# Patient Record
Sex: FEMALE | Race: WHITE | NOT HISPANIC OR LATINO | Employment: FULL TIME
[De-identification: names, ages, dates, MRNs, and addresses within clinical notes are randomized per-mention and may not be internally consistent; named-entity substitution may affect disease eponyms.]

---

## 2018-02-04 ENCOUNTER — HEALTH MAINTENANCE LETTER (OUTPATIENT)
Age: 33
End: 2018-02-04

## 2018-09-19 ENCOUNTER — APPOINTMENT (OUTPATIENT)
Dept: ULTRASOUND IMAGING | Facility: CLINIC | Age: 33
End: 2018-09-19
Attending: FAMILY MEDICINE
Payer: COMMERCIAL

## 2018-09-19 ENCOUNTER — HOSPITAL ENCOUNTER (EMERGENCY)
Facility: CLINIC | Age: 33
Discharge: HOME | End: 2018-09-19
Attending: FAMILY MEDICINE | Admitting: FAMILY MEDICINE
Payer: COMMERCIAL

## 2018-09-19 VITALS
RESPIRATION RATE: 16 BRPM | HEART RATE: 84 BPM | TEMPERATURE: 98.3 F | OXYGEN SATURATION: 98 % | WEIGHT: 238.25 LBS | BODY MASS INDEX: 39.65 KG/M2 | DIASTOLIC BLOOD PRESSURE: 87 MMHG | SYSTOLIC BLOOD PRESSURE: 141 MMHG

## 2018-09-19 DIAGNOSIS — O20.9 BLEEDING IN EARLY PREGNANCY: ICD-10-CM

## 2018-09-19 LAB
ABO + RH BLD: NORMAL
ABO + RH BLD: NORMAL
B-HCG SERPL-ACNC: 299 IU/L (ref 0–5)
BASOPHILS # BLD AUTO: 0 10E9/L (ref 0–0.2)
BASOPHILS NFR BLD AUTO: 0.2 %
BLOOD BANK CMNT PATIENT-IMP: NORMAL
BLOOD BANK CMNT PATIENT-IMP: NORMAL
DIFFERENTIAL METHOD BLD: ABNORMAL
EOSINOPHIL # BLD AUTO: 0.1 10E9/L (ref 0–0.7)
EOSINOPHIL NFR BLD AUTO: 0.4 %
ERYTHROCYTE [DISTWIDTH] IN BLOOD BY AUTOMATED COUNT: 12.6 % (ref 10–15)
FETAL CELL SCN BLD QL ROSETTE: NORMAL
HCG UR QL: POSITIVE
HCT VFR BLD AUTO: 42.4 % (ref 35–47)
HGB BLD-MCNC: 14.5 G/DL (ref 11.7–15.7)
IMM GRANULOCYTES # BLD: 0 10E9/L (ref 0–0.4)
IMM GRANULOCYTES NFR BLD: 0.3 %
INTERNAL QC OK POCT: YES
LYMPHOCYTES # BLD AUTO: 1.3 10E9/L (ref 0.8–5.3)
LYMPHOCYTES NFR BLD AUTO: 8.4 %
MCH RBC QN AUTO: 31.3 PG (ref 26.5–33)
MCHC RBC AUTO-ENTMCNC: 34.2 G/DL (ref 31.5–36.5)
MCV RBC AUTO: 91 FL (ref 78–100)
MONOCYTES # BLD AUTO: 1 10E9/L (ref 0–1.3)
MONOCYTES NFR BLD AUTO: 6.4 %
NEUTROPHILS # BLD AUTO: 12.6 10E9/L (ref 1.6–8.3)
NEUTROPHILS NFR BLD AUTO: 84.3 %
NRBC # BLD AUTO: 0 10*3/UL
NRBC BLD AUTO-RTO: 0 /100
PLATELET # BLD AUTO: 267 10E9/L (ref 150–450)
RBC # BLD AUTO: 4.64 10E12/L (ref 3.8–5.2)
RH IG VIALS RECOM PATIENT: NORMAL
WBC # BLD AUTO: 15 10E9/L (ref 4–11)

## 2018-09-19 PROCEDURE — 86901 BLOOD TYPING SEROLOGIC RH(D): CPT | Performed by: FAMILY MEDICINE

## 2018-09-19 PROCEDURE — 86900 BLOOD TYPING SEROLOGIC ABO: CPT | Performed by: FAMILY MEDICINE

## 2018-09-19 PROCEDURE — 99284 EMERGENCY DEPT VISIT MOD MDM: CPT | Mod: Z6 | Performed by: FAMILY MEDICINE

## 2018-09-19 PROCEDURE — 99284 EMERGENCY DEPT VISIT MOD MDM: CPT | Mod: 25 | Performed by: FAMILY MEDICINE

## 2018-09-19 PROCEDURE — 85461 HEMOGLOBIN FETAL: CPT | Performed by: FAMILY MEDICINE

## 2018-09-19 PROCEDURE — 84702 CHORIONIC GONADOTROPIN TEST: CPT | Performed by: FAMILY MEDICINE

## 2018-09-19 PROCEDURE — 81025 URINE PREGNANCY TEST: CPT | Performed by: FAMILY MEDICINE

## 2018-09-19 PROCEDURE — 85025 COMPLETE CBC W/AUTO DIFF WBC: CPT | Performed by: FAMILY MEDICINE

## 2018-09-19 PROCEDURE — 76817 TRANSVAGINAL US OBSTETRIC: CPT

## 2018-09-19 RX ORDER — PRENATAL VIT/IRON FUM/FOLIC AC 27MG-0.8MG
1 TABLET ORAL DAILY
COMMUNITY

## 2018-09-19 NOTE — ED PROVIDER NOTES
History     Chief Complaint   Patient presents with     Vaginal Bleeding     pt found out that she was pregnant yesterday, started having vaginal bleeding yesterday, ? last period 1 week ago 1 month prior also, going through fertility testing     HPI  Neetu Bella is a 33 year old female who presents with right-sided pelvic pain and vaginal bleeding.  Patient is  1 para 0, being followed by the [facility].  They are assisting her in getting pregnant with her wife.  She was treated with Clomid, an ovulatory agent, and intrauterine insemination in late August.  Early yesterday morning she developed scant vaginal bleeding and some right-sided pelvic pain.  She was seen at the [facility] and reportedly had an hCG of 420.  She advised to return for serial hCG tomorrow.  This morning her bleeding became more prominent and the pain increased, she called the clinic and was referred to the emergency department.  Denies any other symptoms, no urinary complaints, no fever, no history of STDs or abdominal surgery.    I have reviewed the Medications, Allergies, Past Medical and Surgical History, and Social History in the Epic system.    Review of Systems  All other systems were reviewed and are negative    Physical Exam   BP: (P) 141/84  Pulse: (P) 87  Temp: (P) 98.3  F (36.8  C)  Resp: (P) 16  Weight: (P) 108.1 kg (238 lb 4 oz)  SpO2: (P) 99 %      Physical Exam   Constitutional: She is oriented to person, place, and time. She appears well-developed and well-nourished.   HENT:   Head: Normocephalic and atraumatic.   Mouth/Throat: Oropharynx is clear and moist.   Eyes: EOM are normal. Pupils are equal, round, and reactive to light.   Neck: Normal range of motion. Neck supple. No tracheal deviation present. No thyromegaly present.   Cardiovascular: Normal rate, regular rhythm, normal heart sounds and intact distal pulses.  Exam reveals no gallop and no friction rub.     No murmur heard.  Pulmonary/Chest: Effort normal and breath sounds normal. She exhibits no tenderness.   Abdominal: Soft. Bowel sounds are normal. She exhibits no distension and no mass. There is no tenderness.   Genitourinary: Cervix exhibits discharge (The cervix is closed to visualization and direct palpation but there is blood oozing from the cervical  os). Cervix exhibits no motion tenderness and no friability. Right adnexum displays tenderness. Right adnexum displays no fullness. There is bleeding in the vagina.   Musculoskeletal: She exhibits no edema or tenderness.   Neurological: She is alert and oriented to person, place, and time. No cranial nerve deficit. Coordination normal.   Skin: Skin is warm and dry. No rash noted.   Psychiatric: She has a normal mood and affect. Her behavior is normal.   Nursing note and vitals reviewed.      ED Course     ED Course     Procedures             Critical Care time:  none             Labs Ordered and Resulted from Time of ED Arrival Up to the Time of Departure from the ED   CBC WITH PLATELETS DIFFERENTIAL - Abnormal; Notable for the following:        Result Value    WBC 15.0 (*)     Absolute Neutrophil 12.6 (*)     All other components within normal limits   HCG QUANTITATIVE PREGNANCY - Abnormal; Notable for the following:     HCG Quantitative Serum 299 (*)     All other components within normal limits   HCG QUAL URINE POCT - Abnormal; Notable for the following:    PREP FOR PROCEDURE   RHOGAM ORDER   RH IMMUNE GLOBULIN STUDY            Assessments & Plan (with Medical Decision Making)   Patient who is  1 para 0, infertility patient with early first trimester pregnancy, presenting with right adnexal pain and vaginal bleeding.  Differential diagnosis includes ectopic pregnancy, spontaneous AB, threatened AB, subchorionic hemorrhage, ovarian cyst or torsion, cervicitis.  On exam her vital signs are normal.  She has significant tenderness to palpation in the right  adnexa and some bleeding from the cervical loss, amount similar to expected with menses.  Cervix was closed.  Remainder of the physical exam normal and benign.  Quantitative beta hCG is decreasing.  Hemoglobin stable.  Patient is Rh type positive.  Ultrasound does not show intrauterine products.  Case was discussed with the OB/GYN on-call.  This is likely either early intrauterine pregnancy or spontaneous AB.  Ectopic is highly unlikely.  Abdominal exam benign.  Based on the clinical findings and the entire clinical scenario, the patient appears stable at this time to be treated symptomatically, and to follow-up as an outpatient for any further evaluation and treatment.  Discussed expected course, need for follow up, and indications for return with the patient.  See discharge instructions.      I have reviewed the nursing notes.    I have reviewed the findings, diagnosis, plan and need for follow up with the patient.    Discharge Medication List as of 9/19/2018 11:44 AM          Final diagnoses:   Bleeding in early pregnancy       9/19/2018   [facility], [place], EMERGENCY DEPARTMENT     Trae White MD  09/19/18 9088

## 2018-09-19 NOTE — ED AVS SNAPSHOT
[facility], Emergency Department    [address]    [place] [address]    Phone:  [phone]    Fax:  [phone]                                       Neetu Bella   MRN: [id number]    Department:  [facility], Emergency Department   Date of Visit:  9/19/2018           Patient Information     Date Of Birth          1985        Your diagnoses for this visit were:     Bleeding in early pregnancy        You were seen by Trae White MD.        Discharge Instructions       Thank you for choosing [facility].     Please closely monitor for further symptoms. Return to the Emergency Department if you develop any new or worsening signs or symptoms.    If you received any opiate pain medications or sedatives during your visit, please do not drive for at least 8 hours.     Labs, cultures or final xray interpretations may still need to be reviewed.  We will call you if your plan of care needs to be changed.    Please follow up with your OB-GYN clinic this week.      Bleeding During Early Pregnancy     Ultrasound can help check the health of your fetus.     If you ve had bleeding early in your pregnancy, you re not alone. Many other pregnant women have had early bleeding, too. And in most cases, nothing is wrong. But your healthcare provider still needs to know about it. He or she may want to do tests to find out why you re bleeding. Call your healthcare provider if you notice bleeding during pregnancy.  What causes early bleeding?  The cause of bleeding early in pregnancy is often unknown. But many factors early on in pregnancy may lead to bleeding or spotting. These include sexual intercourse, which may cause bleeding in any trimester. Here are some other causes:    Implantation of the embryo on the uterine wall    Subchorionic hemorrhage (bleeding between the sac membrane and the uterus)    Miscarriage    Ectopic (tubal) pregnancy  If you notice  spotting  Spotting (very light bleeding) is the most common type of bleeding in early pregnancy. If you notice it, call your healthcare provider. Chances are, he or she will tell you that you can care for yourself at home.  If tests are needed  Depending on how much you bleed, your healthcare provider may ask you to come in for some tests. A pelvic exam, for instance, can help see how far along your pregnancy is. You also may have an ultrasound or a Doppler test. These imaging tests use sound waves to check the health of your fetus. The ultrasound may be done on your belly or inside your vagina. Your healthcare provider also may order a special blood test. This test compares your hormone levels in blood samples taken 2 days apart. The results can help your healthcare provider learn more about the implantation of the embryo. Your blood type will also need to be checked to evaluate whether you will need to be treated for Rh sensitization.   Warning signs  If your bleeding doesn t stop or if you notice any of the following, seek medical help right away:    Soaking a sanitary pad each hour    Bleeding like you re having a period    Cramping or severe belly pain    Feeling dizzy or faint    Tissue passing through your vagina    Bleeding at any time after the first trimester  Questions you may be asked  Though not normal, bleeding early in pregnancy is common. If you ve noticed any bleeding, you may be concerned. But keep in mind that bleeding alone doesn t mean something is wrong. Call your healthcare provider right away, though. He or she may ask you questions like these to help find the cause of your bleeding:    When did your bleeding start?    Is your bleeding very light (spotting) or is it like a period?    Is the blood bright red or brownish?    Have you had sexual intercourse recently?    Have you had pain or cramping?    Have you felt dizzy or faint?  Monitoring your pregnancy  Bleeding will often stop as quickly  as it began. Your pregnancy may go on a normal path again. You may need to make a few extra prenatal visits. But you and your baby will most likely be fine.  Date Last Reviewed: 6/1/2016 2000-2017 [employer]. [address] [place] [address], [place] [address]. All rights reserved. This information is not intended as a substitute for professional medical care. Always follow your healthcare professional's instructions.            24 Hour Appointment Hotline       To make an appointment at any [facility], call [phone] ([phone]). If you don't have a family doctor or clinic, we will help you find one. [facility] clinics are conveniently located to serve the needs of you and your family.             Review of your medicines      Our records show that you are taking the medicines listed below. If these are incorrect, please call your family doctor or clinic.        Dose / Directions Last dose taken    prenatal multivitamin plus iron 27-0.8 MG Tabs per tablet   Dose:  1 tablet        Take 1 tablet by mouth daily   Refills:  0                Procedures and tests performed during your visit     CBC with platelets differential    HCG quantitative pregnancy    Prep for procedure - pelvic exam    Rh Immune Globulin Study    Rho (D) immune globulin (RhoGam) Lab Study    US OB < 14 Weeks w Transvaginal    hCG qual urine POCT      Orders Needing Specimen Collection     None      Pending Results     No orders found from 9/17/2018 to 9/20/2018.            Pending Culture Results     No orders found from 9/17/2018 to 9/20/2018.            Pending Results Instructions     If you had any lab results that were not finalized at the time of your Discharge, you can call the ED Lab Result RN at [phone]. You will be contacted by this team for any positive Lab results or changes in treatment. The nurses are available 7 days a week from 10A to 6:30P.  You can leave a message 24 hours per day and they will return  your call.        Thank you for choosing [facility]       Thank you for choosing [facility] for your care. Our goal is always to provide you with excellent care. Hearing back from our patients is one way we can continue to improve our services. Please take a few minutes to complete the written survey that you may receive in the mail after you visit with us. Thank you!        [employer] Information     [employer] gives you secure access to your electronic health record. If you see a primary care provider, you can also send messages to your care team and make appointments. If you have questions, please call your primary care clinic.  If you do not have a primary care provider, please call [phone] and they will assist you.        Care EveryWhere ID     This is your Care EveryWhere ID. This could be used by other organizations to access your [facility] medical records  [id number]        Equal Access to Services     PALOMO MCGEE : Candace Bowens, kiko jules, sapna braun, madison banks. [facility] [phone].    ATENCIÓN: Si habla español, tiene a bedoya disposición servicios gratuitos de asistencia lingüística. Llame al [phone].    We comply with applicable federal civil rights laws and [place] laws. We do not discriminate on the basis of race, color, national origin, age, disability, sex, sexual orientation, or gender identity.            After Visit Summary       This is your record. Keep this with you and show to your community pharmacist(s) and doctor(s) at your next visit.

## 2018-09-19 NOTE — DISCHARGE INSTRUCTIONS
Thank you for choosing [facility].     Please closely monitor for further symptoms. Return to the Emergency Department if you develop any new or worsening signs or symptoms.    If you received any opiate pain medications or sedatives during your visit, please do not drive for at least 8 hours.     Labs, cultures or final xray interpretations may still need to be reviewed.  We will call you if your plan of care needs to be changed.    Please follow up with your OB-GYN clinic this week.      Bleeding During Early Pregnancy     Ultrasound can help check the health of your fetus.     If you ve had bleeding early in your pregnancy, you re not alone. Many other pregnant women have had early bleeding, too. And in most cases, nothing is wrong. But your healthcare provider still needs to know about it. He or she may want to do tests to find out why you re bleeding. Call your healthcare provider if you notice bleeding during pregnancy.  What causes early bleeding?  The cause of bleeding early in pregnancy is often unknown. But many factors early on in pregnancy may lead to bleeding or spotting. These include sexual intercourse, which may cause bleeding in any trimester. Here are some other causes:    Implantation of the embryo on the uterine wall    Subchorionic hemorrhage (bleeding between the sac membrane and the uterus)    Miscarriage    Ectopic (tubal) pregnancy  If you notice spotting  Spotting (very light bleeding) is the most common type of bleeding in early pregnancy. If you notice it, call your healthcare provider. Chances are, he or she will tell you that you can care for yourself at home.  If tests are needed  Depending on how much you bleed, your healthcare provider may ask you to come in for some tests. A pelvic exam, for instance, can help see how far along your pregnancy is. You also may have an ultrasound or a Doppler test. These imaging tests use sound waves to check the health of  your fetus. The ultrasound may be done on your belly or inside your vagina. Your healthcare provider also may order a special blood test. This test compares your hormone levels in blood samples taken 2 days apart. The results can help your healthcare provider learn more about the implantation of the embryo. Your blood type will also need to be checked to evaluate whether you will need to be treated for Rh sensitization.   Warning signs  If your bleeding doesn t stop or if you notice any of the following, seek medical help right away:    Soaking a sanitary pad each hour    Bleeding like you re having a period    Cramping or severe belly pain    Feeling dizzy or faint    Tissue passing through your vagina    Bleeding at any time after the first trimester  Questions you may be asked  Though not normal, bleeding early in pregnancy is common. If you ve noticed any bleeding, you may be concerned. But keep in mind that bleeding alone doesn t mean something is wrong. Call your healthcare provider right away, though. He or she may ask you questions like these to help find the cause of your bleeding:    When did your bleeding start?    Is your bleeding very light (spotting) or is it like a period?    Is the blood bright red or brownish?    Have you had sexual intercourse recently?    Have you had pain or cramping?    Have you felt dizzy or faint?  Monitoring your pregnancy  Bleeding will often stop as quickly as it began. Your pregnancy may go on a normal path again. You may need to make a few extra prenatal visits. But you and your baby will most likely be fine.  Date Last Reviewed: 6/1/2016 2000-2017 [employer]. [address] [place] [address], [place] [address]. All rights reserved. This information is not intended as a substitute for professional medical care. Always follow your healthcare professional's instructions.

## 2018-09-19 NOTE — ED AVS SNAPSHOT
[facility], Emergency Department    [address]    [facility] [address]    Phone:  [phone]    Fax:  [phone]                                       Neetu Bella   MRN: [id number]    Department:  [facility], Emergency Department   Date of Visit:  9/19/2018           After Visit Summary Signature Page     I have received my discharge instructions, and my questions have been answered. I have discussed any challenges I see with this plan with the nurse or doctor.    ..........................................................................................................................................  Patient/Patient Representative Signature      ..........................................................................................................................................  Patient Representative Print Name and Relationship to Patient    ..................................................               ................................................  Date                                   Time    ..........................................................................................................................................  Reviewed by Signature/Title    ...................................................              ..............................................  Date                                               Time          22EPIC Rev 08/18

## 2018-09-19 NOTE — CONSULTS
Gynecology Consult Note    Patient Summary:  Neetu Bella is a 33 year old female seen at the request of Trae White MD (ED).      HPI: Neetu Bella is a 33 year old  with pregnancy of unknown location given positive UPT on  who presented to the ED with vaginal bleeding and lower abdominal pain. Of note, she underwent ovulation induction and IUI at [facility] on  and had prior negative UPTs prior to her  positive UPT.  She states that yesterday she passed a few small golf ball size clots in the toilet.  Prior to this she had 3 days of light bleeding from 9\10-\12.  She had no cramping at this time.  Yesterday she noticed after passage of clots that she had onset of lower abdominal pain that felt as if it was more constant than cramping.  Yesterday she presented to [facility] and was recommended to undergo beta-hCG testing that returned at 420.  She was not getting any other further recommendations for follow-up plans.  This morning she noticed that she had further onset lower abdominal pain that she describes as deep in the lower pelvis and almost constant however with wavelike increase in intensity.  The pain has subsided some but is still present.  She has minor vaginal bleeding at this time and denies heavy vaginal bleeding.      She denies fevers chills, shortness of breath, lightheadedness or dizziness, chest pain, headache, upper abdominal pain, change in vaginal discharge, itching or burning of the vagina, burning on urination, constipation or pain with bowel movements, blood in her stool, diarrhea.  Denies any other reason for her abdominal pain at this time. She is concerned that she is having a miscarriage.    ROS:    ROS: 10 point ROS neg other than the symptoms noted above in the HPI.    PMH:   History reviewed. No pertinent past medical history.    PSHx:   History reviewed. No pertinent surgical history.    Medications:   Current Facility-Administered Medications    Medication     Blood Bank will determine if patient is eligible for and the proper dosage of Rho (D) immune globulin (RhoGam)     NO Rho (D)  immune globulin (RhoGam) needed  - mother INELIGIBLE     Current Outpatient Prescriptions   Medication     Prenatal Vit-Fe Fumarate-FA (PRENATAL MULTIVITAMIN PLUS IRON) 27-0.8 MG TABS per tablet       No current facility-administered medications on file prior to encounter.   No current outpatient prescriptions on file prior to encounter.    Allergies:      Allergies   Allergen Reactions     Codeine      Strong family hx, advised not to use        Social History:   Social History     Social History     Marital status: Single     Spouse name: N/A     Number of children: N/A     Years of education: N/A     Occupational History     Not on file.     Social History Main Topics     Smoking status: Never Smoker     Smokeless tobacco: Never Used     Alcohol use No      Comment: Rare      Drug use: No     Sexual activity: Yes     Partners: Female     Other Topics Concern     Not on file     Social History Narrative     Social History     Social History     Marital status: Single     Spouse name: N/A     Number of children: N/A     Years of education: N/A     Social History Main Topics     Smoking status: Never Smoker     Smokeless tobacco: Never Used     Alcohol use No      Comment: Rare      Drug use: No     Sexual activity: Yes     Partners: Female     Other Topics Concern     None     Social History Narrative       Physical Exam:   Vitals:    09/19/18 0823 09/19/18 0828 09/19/18 1150   BP: (P) 141/84 141/87    Pulse: (P) 87 84    Resp: (P) 16 16 16   Temp: (P) 98.3  F (36.8  C) 98.3  F (36.8  C)    TempSrc: (P) Oral Oral    SpO2: (P) 99% 98%    Weight: (P) 108.1 kg (238 lb 4 oz) 108.1 kg (238 lb 4 oz)       Gen: lying in bed, appears comfortable in NAD, wife at bedside  Abd: non-tender in all quadrants with exception of minimal tenderness over suprapubic region,  non-distended  Pelvis: deferred given ED provider exam with closed cervical os and minor bleeding on exam   Extremities: non-tender, no erythema; trace edema    Labs:  B-hCG from [facility] : 420 > 299  Component      Latest Ref Rng & Units 2018   WBC      4.0 - 11.0 10e9/L 15.0 (H)   RBC Count      3.8 - 5.2 10e12/L 4.64   Hemoglobin      11.7 - 15.7 g/dL 14.5   Hematocrit      35.0 - 47.0 % 42.4   MCV      78 - 100 fl 91   MCH      26.5 - 33.0 pg 31.3   MCHC      31.5 - 36.5 g/dL 34.2   RDW      10.0 - 15.0 % 12.6   Platelet Count      150 - 450 10e9/L 267   Diff Method       Automated Method   % Neutrophils      % 84.3   % Lymphocytes      % 8.4   % Monocytes      % 6.4   % Eosinophils      % 0.4   % Basophils      % 0.2   % Immature Granulocytes      % 0.3   Nucleated RBCs      0 /100 0   Absolute Neutrophil      1.6 - 8.3 10e9/L 12.6 (H)   Absolute Lymphocytes      0.8 - 5.3 10e9/L 1.3   Absolute Monocytes      0.0 - 1.3 10e9/L 1.0   Absolute Eosinophils      0.0 - 0.7 10e9/L 0.1   Absolute Basophils      0.0 - 0.2 10e9/L 0.0   Abs Immature Granulocytes      0 - 0.4 10e9/L 0.0   Absolute Nucleated RBC       0.0         Assessment:  Neetu Bella is a  now  with recent SAB likely yesterday s/p ovulation induction and IUI at [facility] on 18.  Today B-hCG is downtrending and now 299 from 480.  US is also normal findings without IUP or concern for retained products of conception vs. Ectopic pregnancy as no adnexal mass.  Small amount of fluid on US which is consistent with ovulation induction. Overall hemodynamically stable. Hgb 14. VS stable and normal. Will trend B-hCGs with her primary OB/CRM.      Plan:   - recommend follow-up with CRM or primary OB/GYN for serial B-hCGs to follow downtrend  - discussed with patient likely SAB, but plan to continue to follow closely in outpatient setting with CRM/primary OB. Patient to call and make appointment    Thank you for this consult.  Please  do not hesitate to contact us with concerns or questions.     Patient staffed with Dr. Nur.     Sabrina Robles MD MPH  OB/GYN, PGY4  Pager: [phone]  9/19/2018  1:49 PM

## 2018-11-25 ENCOUNTER — APPOINTMENT (OUTPATIENT)
Dept: ULTRASOUND IMAGING | Facility: CLINIC | Age: 33
End: 2018-11-25
Attending: EMERGENCY MEDICINE
Payer: COMMERCIAL

## 2018-11-25 ENCOUNTER — HOSPITAL ENCOUNTER (EMERGENCY)
Facility: CLINIC | Age: 33
Discharge: HOME | End: 2018-11-25
Attending: EMERGENCY MEDICINE | Admitting: EMERGENCY MEDICINE
Payer: COMMERCIAL

## 2018-11-25 VITALS
TEMPERATURE: 98 F | OXYGEN SATURATION: 95 % | SYSTOLIC BLOOD PRESSURE: 116 MMHG | RESPIRATION RATE: 20 BRPM | DIASTOLIC BLOOD PRESSURE: 68 MMHG

## 2018-11-25 DIAGNOSIS — R10.11 RUQ ABDOMINAL PAIN: ICD-10-CM

## 2018-11-25 DIAGNOSIS — K80.20 GALLSTONES: ICD-10-CM

## 2018-11-25 LAB
ALBUMIN SERPL-MCNC: 3.4 G/DL (ref 3.4–5)
ALP SERPL-CCNC: 41 U/L (ref 40–150)
ALT SERPL W P-5'-P-CCNC: 24 U/L (ref 0–50)
ANION GAP SERPL CALCULATED.3IONS-SCNC: 7 MMOL/L (ref 3–14)
AST SERPL W P-5'-P-CCNC: 10 U/L (ref 0–45)
BASOPHILS # BLD AUTO: 0 10E9/L (ref 0–0.2)
BASOPHILS NFR BLD AUTO: 0.4 %
BILIRUB SERPL-MCNC: 1 MG/DL (ref 0.2–1.3)
BUN SERPL-MCNC: 12 MG/DL (ref 7–30)
CALCIUM SERPL-MCNC: 8.3 MG/DL (ref 8.5–10.1)
CHLORIDE SERPL-SCNC: 109 MMOL/L (ref 94–109)
CO2 SERPL-SCNC: 25 MMOL/L (ref 20–32)
CREAT SERPL-MCNC: 0.73 MG/DL (ref 0.52–1.04)
DIFFERENTIAL METHOD BLD: NORMAL
EOSINOPHIL # BLD AUTO: 0.1 10E9/L (ref 0–0.7)
EOSINOPHIL NFR BLD AUTO: 1.3 %
ERYTHROCYTE [DISTWIDTH] IN BLOOD BY AUTOMATED COUNT: 11.9 % (ref 10–15)
GFR SERPL CREATININE-BSD FRML MDRD: >90 ML/MIN/1.7M2
GLUCOSE SERPL-MCNC: 101 MG/DL (ref 70–99)
HCG UR QL: NEGATIVE
HCT VFR BLD AUTO: 39.5 % (ref 35–47)
HGB BLD-MCNC: 13.3 G/DL (ref 11.7–15.7)
IMM GRANULOCYTES # BLD: 0 10E9/L (ref 0–0.4)
IMM GRANULOCYTES NFR BLD: 0.2 %
LIPASE SERPL-CCNC: 109 U/L (ref 73–393)
LYMPHOCYTES # BLD AUTO: 2.6 10E9/L (ref 0.8–5.3)
LYMPHOCYTES NFR BLD AUTO: 28.7 %
MCH RBC QN AUTO: 31.6 PG (ref 26.5–33)
MCHC RBC AUTO-ENTMCNC: 33.7 G/DL (ref 31.5–36.5)
MCV RBC AUTO: 94 FL (ref 78–100)
MONOCYTES # BLD AUTO: 0.7 10E9/L (ref 0–1.3)
MONOCYTES NFR BLD AUTO: 8 %
NEUTROPHILS # BLD AUTO: 5.5 10E9/L (ref 1.6–8.3)
NEUTROPHILS NFR BLD AUTO: 61.4 %
NRBC # BLD AUTO: 0 10*3/UL
NRBC BLD AUTO-RTO: 0 /100
PLATELET # BLD AUTO: 252 10E9/L (ref 150–450)
POTASSIUM SERPL-SCNC: 3.8 MMOL/L (ref 3.4–5.3)
PROT SERPL-MCNC: 6.5 G/DL (ref 6.8–8.8)
RBC # BLD AUTO: 4.21 10E12/L (ref 3.8–5.2)
SODIUM SERPL-SCNC: 141 MMOL/L (ref 133–144)
TROPONIN I SERPL-MCNC: <0.015 UG/L (ref 0–0.04)
WBC # BLD AUTO: 8.9 10E9/L (ref 4–11)

## 2018-11-25 PROCEDURE — 36415 COLL VENOUS BLD VENIPUNCTURE: CPT | Performed by: EMERGENCY MEDICINE

## 2018-11-25 PROCEDURE — 80053 COMPREHEN METABOLIC PANEL: CPT | Performed by: EMERGENCY MEDICINE

## 2018-11-25 PROCEDURE — 76705 ECHO EXAM OF ABDOMEN: CPT

## 2018-11-25 PROCEDURE — 81025 URINE PREGNANCY TEST: CPT | Performed by: EMERGENCY MEDICINE

## 2018-11-25 PROCEDURE — 84484 ASSAY OF TROPONIN QUANT: CPT | Performed by: EMERGENCY MEDICINE

## 2018-11-25 PROCEDURE — 96361 HYDRATE IV INFUSION ADD-ON: CPT

## 2018-11-25 PROCEDURE — 83690 ASSAY OF LIPASE: CPT | Performed by: EMERGENCY MEDICINE

## 2018-11-25 PROCEDURE — 96375 TX/PRO/DX INJ NEW DRUG ADDON: CPT

## 2018-11-25 PROCEDURE — 93005 ELECTROCARDIOGRAM TRACING: CPT

## 2018-11-25 PROCEDURE — 96376 TX/PRO/DX INJ SAME DRUG ADON: CPT

## 2018-11-25 PROCEDURE — 85025 COMPLETE CBC W/AUTO DIFF WBC: CPT | Performed by: EMERGENCY MEDICINE

## 2018-11-25 PROCEDURE — 25000128 H RX IP 250 OP 636: Performed by: EMERGENCY MEDICINE

## 2018-11-25 PROCEDURE — 96374 THER/PROPH/DIAG INJ IV PUSH: CPT

## 2018-11-25 PROCEDURE — 99285 EMERGENCY DEPT VISIT HI MDM: CPT | Mod: 25

## 2018-11-25 RX ORDER — HYDROMORPHONE HYDROCHLORIDE 2 MG/1
2 TABLET ORAL EVERY 6 HOURS PRN
Qty: 10 TABLET | Refills: 0 | Status: ON HOLD | OUTPATIENT
Start: 2018-11-25 | End: 2018-11-27

## 2018-11-25 RX ORDER — HYDROMORPHONE HYDROCHLORIDE 1 MG/ML
0.5 INJECTION, SOLUTION INTRAMUSCULAR; INTRAVENOUS; SUBCUTANEOUS
Status: COMPLETED | OUTPATIENT
Start: 2018-11-25 | End: 2018-11-25

## 2018-11-25 RX ORDER — ONDANSETRON 4 MG/1
4 TABLET, ORALLY DISINTEGRATING ORAL EVERY 8 HOURS PRN
Qty: 10 TABLET | Refills: 0 | Status: ON HOLD | OUTPATIENT
Start: 2018-11-25 | End: 2018-11-27

## 2018-11-25 RX ORDER — ONDANSETRON 2 MG/ML
4 INJECTION INTRAMUSCULAR; INTRAVENOUS
Status: COMPLETED | OUTPATIENT
Start: 2018-11-25 | End: 2018-11-25

## 2018-11-25 RX ORDER — SODIUM CHLORIDE 9 MG/ML
1000 INJECTION, SOLUTION INTRAVENOUS CONTINUOUS
Status: DISCONTINUED | OUTPATIENT
Start: 2018-11-25 | End: 2018-11-25 | Stop reason: HOSPADM

## 2018-11-25 RX ADMIN — SODIUM CHLORIDE 1000 ML: 9 INJECTION, SOLUTION INTRAVENOUS at 08:20

## 2018-11-25 RX ADMIN — ONDANSETRON 4 MG: 2 INJECTION INTRAMUSCULAR; INTRAVENOUS at 08:20

## 2018-11-25 RX ADMIN — Medication 0.5 MG: at 09:50

## 2018-11-25 RX ADMIN — Medication 0.5 MG: at 12:35

## 2018-11-25 RX ADMIN — Medication 0.5 MG: at 08:20

## 2018-11-25 ASSESSMENT — ENCOUNTER SYMPTOMS
BACK PAIN: 1
VOMITING: 1
ABDOMINAL PAIN: 1

## 2018-11-25 NOTE — LETTER
November 25, 2018      To Whom It May Concern:      Neetu Bella was seen in our Emergency Department today, 11/25/18.  I expect her condition to improve over the next 3 days.  She may return to work when improved.    Sincerely,        Renato Navarro RN

## 2018-11-25 NOTE — ED TRIAGE NOTES
Abdominal pain that began about 4 days ago, patient began vomiting yesterday 3 times last time being this morning at 3am, patient is having 7/10 abdominal pain, pinpoint to RUQ that radiates to LUQ and mid back, pain is constant, eating and drinking makes it worse, movement at times makes it feel better VSS, A&OX4

## 2018-11-25 NOTE — ED PROVIDER NOTES
History     Chief Complaint:  Abdominal Pain      HPI   Neetu Bella is a 33 year old female who presents with post-prandial abdominal pain in the RUQ. Pain has been getting worse since about one week ago, and now she states it is a 7 or 8 out of 10 on the pain scale. She describes this as a constant pain, not throbbing or stabbing. The pain radiates to the back and shoulder blade. She also endorses three episodes of vomiting yesterday. In March she was seen for similar symptoms and was told she had some gas in the colon. Patient was seen here for vaginal bleeding and a miscarriage on 9/19/18. Patient has not had a cholecystectomy. She denies chest pain.     Cardiac Risk Factors   Sex: Female   Tobacco: Negative   Hypertension: Negative  Diabetes: Negative  Hyperlipidemia: Negative  Family History: Negative    Allergies:  Codeine     Medications:    Prenatal vitamin     Past Medical History:    History reviewed.  No significant past medical history.     Past Surgical History:    Jackson teeth  Tonsillectomy    Family History:    Skin cancer  HTN  HLD    Social History:  Presents to the ED with her wife.   Tobacco Use: Never  Alcohol Use: Rarely  PCP: Marcia Sin     Review of Systems   Gastrointestinal: Positive for abdominal pain and vomiting.   Musculoskeletal: Positive for back pain.   All other systems reviewed and are negative.      Physical Exam   First Vitals:  BP: 138/87  Heart Rate: 84  Temp: 98  F (36.7  C)  Resp: 20  SpO2: 98 %      Physical Exam  General: The patient is alert, in no respiratory distress.    HENT: Mucous membranes moist.    Cardiovascular: Regular rate and rhythm. Good pulses in all four extremities. Normal capillary refill and skin turgor.     Respiratory: Lungs are clear. No nasal flaring. No retractions. No wheezing, no crackles.    Gastrointestinal: Abdomen soft. No guarding, no rebound. No palpable hernias. Right upper quadrant tenderness.      Musculoskeletal: No gross deformity.     Skin: No rashes or petechiae.     Neurologic: The patient is alert and oriented x3. GCS 15. No testable cranial nerve deficit. Follows commands with clear and appropriate speech. Gives appropriate answers. Good strength in all extremities. No gross neurologic deficit. Gross sensation intact. Pupils are round and reactive. No meningismus.     Lymphatic: No cervical adenopathy. No lower extremity swelling.    Psychiatric: The patient is non-tearful.    Emergency Department Course     EKG:  @ 0800  Indication: RUQ abdominal pain  Vent. Rate 71 bpm. MD interval 134 ms. QRS duration 102 ms. QT/QTc 408/443 ms. P-R-T axis 17 40 29.   Normal sinus rhythm. Normal ECG.    Read @ 0810 by Dr. Fraire.    Imaging:  Radiographic findings were communicated with the patient who voiced understanding of the findings.    US Abdomen Limited   Preliminary Result   IMPRESSION:  Positive sonographic Lo's sign. Multiple gallstones.   As such, cholecystitis is not excluded. No biliary dilatation.        Laboratory:  CBC:  WBC 8.9, HGB 13.3, , otherwise WNL  CMP: Glucose 101 (H), calcium 8.3 (L), protein 6.5 (L), otherwise WNL (Creatinine 0.73)  Lipase: 109  Troponin: <0.015  HCG urine: Negative    Interventions:  0820: Normal Saline, 1 liter, IV bolus   0820: Zofran, 4 mg, IV injection   0820: Dilaudid, 0.5 mg, IV injection   0950: Dilaudid, 0.5 mg, IV injection     Emergency Department Course:  The patient arrived in triage where vitals were measured and recorded.   The patient was then escorted back to the emergency department.   The patient's medical records were reviewed.  Nursing notes and vitals were reviewed.  0749: I performed an exam of the patient as documented above.  The above workup was undertaken.  0925: I rechecked the patient and discussed results.  Findings and plan explained to the Patient. Patient discharged home, status improved, with instructions regarding  supportive care, medications, and reasons to return as well as the importance of close follow-up was reviewed. Patient was prescribed Dilaudid.     Impression & Plan      Medical Decision Making:  The patient presented a very classic story for biliary colic, RUQ abdominal pain. She is tender in that area. There were gallstones and a positive sonographic Lo's sign but no signs of thickened wall. Her white count was normal. LFTs were within normal limits. I do not feel there is likely cholecystitis. She felt comfortable following up with the surgeon, was discharged home in good condition. Of note, there is no evidence of cardiac disease or pregnancy.    Diagnosis:    ICD-10-CM    1. RUQ abdominal pain R10.11    2. Gallstones K80.20        Disposition:  Discharged to home.     Discharge Medications:  New Prescriptions    HYDROMORPHONE (DILAUDID) 2 MG TABLET    Take 1 tablet (2 mg) by mouth every 6 hours as needed for pain         Audrey ELIAS, am serving as a scribe on 11/25/2018 at 7:49 AM to personally document services performed by Dr. Fraire based on my observations and the provider's statements to me.   [facility] EMERGENCY DEPARTMENT       Juan Fraire MD  11/25/18 [id number]

## 2018-11-25 NOTE — ED AVS SNAPSHOT
[facility] Emergency Department    [address]    [facility] [address]    Phone:  [phone]    Fax:  [phone]                                       Neetu Bella   MRN: [id number]    Department:  [facility] Emergency Department   Date of Visit:  11/25/2018           Patient Information     Date Of Birth          1985        Your diagnoses for this visit were:     RUQ abdominal pain     Gallstones        You were seen by Juan Fraire MD.      Follow-up Information     Follow up with [facility]. Schedule an appointment as soon as possible for a visit in 2 days.    Contact information:    [address]  [facility] [address] [phone]        Discharge Instructions       Discharge Instructions  Abdominal Pain    Abdominal pain can be caused by many things. Your evaluation today does not show the exact cause for your pain. Your doctor today has decided that it is unlikely your pain is due to a life threatening problem, or a problem requiring surgery or hospital admission. Sometimes those problems cannot be found right away, so it is very important that you follow up as directed.  Sometimes only the changes which occur over time allow the cause of your pain to be found.    Return to the Emergency Department for a recheck in 8-12 hours if your pain continues.  If your pain gets worse, changes in location, or feels different, return to the Emergency Department right away.    ADULTS:  Return to the Emergency Department right away if:      You get an oral temperature above 102oF or as directed by your doctor.    You have blood in your stools (bright red or black, tarry stools).    You keep throwing up or can t drink liquids.    You see blood when you throw up.    You can t have a bowel movement or you can t pass gas.    Your stomach gets bloated or bigger.    Your skin or the whites of your eyes  look yellow.    You faint.    You have bloody, frequent or painful urination.    You have new symptoms or anything that worries you.    CHILDREN:  Return to the Emergency Department right away if your child has any of the above-listed symptoms or the following:      Pushes your hand away or screams/cries when his/her belly is touched.    You notice your child is very fussy or weak.    Your child is very tired and is too tired to eat or drink.    Your child is dehydrated.  Signs of dehydration can be:  o Your infant has had no wet diapers in 4-5 hours.  o Your older child has not passed urine in 6-8 hours.  o Your infant or child starts to have dry mouth and lips, or no saliva or tears.    PREGNANT WOMEN:  Return to the Emergency Department right away if you have any of the above-listed symptoms or the following:      You have bleeding, leaking fluid or passing tissue from the vagina.    You have worse pain or cramping, or pain in your shoulder or back.    You have vomiting that will not stop.    You have painful or bloody urination.    You have a temperature of 100oF or more.    Your baby is not moving as much as usual.    You faint.    You get a bad headache with or without eye problems and abdominal pain.    You have a convulsion or seizure.    You have unusual discharge from your vagina and abdominal pain.    Abdominal pain is pretty common during pregnancy.  Your pain may or may not be related to your pregnancy. You should follow-up closely with your OB doctor so they can evaluate you and your baby.  Until you follow-up with your regular doctor, do the following:       Avoid sex and do not put anything in your vagina.    Drink clear fluids.    Only take medications approved by your doctor.    MORE INFORMATION:    Appendicitis:  A possible cause of abdominal pain in any person who still has their appendix is acute appendicitis. Appendicitis is often hard to diagnose.  Testing does not always rule out early  "appendicitis or other causes of abdominal pain. Close follow-up with your doctor and re-evaluations may be needed to figure out the reason for your abdominal pain.    Follow-up:  It is very important that you make an appointment with your clinic and go to the appointment.  If you do not follow-up with your primary doctor, it may result in missing an important development which could result in permanent injury or disability and/or lasting pain.  If there is any problem keeping your appointment, call your doctor or return to the Emergency Department.    Medications:  Take your medications as directed by your doctor today.  Before using over-the-counter medications, ask your doctor and make sure to take the medications as directed.  If you have any questions about medications, ask your doctor.    Diet:  Resume your normal diet as much as possible, but do not eat fried, fatty or spicy foods while you have pain.  Do not drink alcohol or have caffeine.  Do not smoke tobacco.    Probiotics: If you have been given an antibiotic, you may want to also take a probiotic pill or eat yogurt with live cultures. Probiotics have \"good bacteria\" to help your intestines stay healthy. Studies have shown that probiotics help prevent diarrhea and other intestine problems (including C. diff infection) when you take antibiotics. You can buy these without a prescription in the pharmacy section of the store.     If you were given a prescription for medicine here today, be sure to read all of the information (including the package insert) that comes with your prescription.  This will include important information about the medicine, its side effects, and any warnings that you need to know about.  The pharmacist who fills the prescription can provide more information and answer questions you may have about the medicine.  If you have questions or concerns that the pharmacist cannot address, please call or return to the Emergency Department. "         Opioid Medication Information    Pain medications are among the most commonly prescribed medicines, so we are including this information for all our patients. If you did not receive pain medication or get a prescription for pain medicine, you can ignore it.     You may have been given a prescription for an opioid (narcotic) pain medicine and/or have received a pain medicine while here in the Emergency Department. These medicines can make you drowsy or impaired. You must not drive, operate dangerous equipment, or engage in any other dangerous activities while taking these medications. If you drive while taking these medications, you could be arrested for DUI, or driving under the influence. Do not drink any alcohol while you are taking these medications.     Opioid pain medications can cause addiction. If you have a history of chemical dependency of any type, you are at a higher risk of becoming addicted to pain medications.  Only take these prescribed medications to treat your pain when all other options have been tried. Take it for as short a time and as few doses as possible. Store your pain pills in a secure place, as they are frequently stolen and provide a dangerous opportunity for children or visitors in your house to start abusing these powerful medications. We will not replace any lost or stolen medicine.  As soon as your pain is better, you should flush all your remaining medication.     Many prescription pain medications contain Tylenol  (acetaminophen), including Vicodin , Tylenol #3 , Norco , Lortab , and Percocet .  You should not take any extra pills of Tylenol  if you are using these prescription medications or you can get very sick.  Do not ever take more than 3000 mg of acetaminophen in any 24 hour period.    All opioids tend to cause constipation. Drink plenty of water and eat foods that have a lot of fiber, such as fruits, vegetables, prune juice, apple juice and high fiber cereal.  Take a  laxative if you don t move your bowels at least every other day. Miralax , Milk of Magnesia, Colace , or Senna  can be used to keep you regular.      Remember that you can always come back to the Emergency Department if you are not able to see your regular doctor in the amount of time listed above, if you get any new symptoms, or if there is anything that worries you.          24 Hour Appointment Hotline       To make an appointment at any [facility], call [phone] ([phone]). If you don't have a family doctor or clinic, we will help you find one. [facility] are conveniently located to serve the needs of you and your family.             Review of your medicines      START taking        Dose / Directions Last dose taken    HYDROmorphone 2 MG tablet   Commonly known as:  DILAUDID   Dose:  2 mg   Quantity:  10 tablet        Take 1 tablet (2 mg) by mouth every 6 hours as needed for pain   Refills:  0          Our records show that you are taking the medicines listed below. If these are incorrect, please call your family doctor or clinic.        Dose / Directions Last dose taken    prenatal multivitamin plus iron 27-0.8 MG Tabs per tablet   Dose:  1 tablet        Take 1 tablet by mouth daily   Refills:  0                Information about OPIOIDS     PRESCRIPTION OPIOIDS: WHAT YOU NEED TO KNOW   We gave you an opioid (narcotic) pain medicine. It is important to manage your pain, but opioids are not always the best choice. You should first try all the other options your care team gave you. Take this medicine for as short a time (and as few doses) as possible.    Some activities can increase your pain, such as bandage changes or therapy sessions. It may help to take your pain medicine 30 to 60 minutes before these activities. Reduce your stress by getting enough sleep, working on hobbies you enjoy and practicing relaxation or meditation. Talk to your care team about ways to manage your pain beyond  prescription opioids.    These medicines have risks:    DO NOT drive when on new or higher doses of pain medicine. These medicines can affect your alertness and reaction times, and you could be arrested for driving under the influence (DUI). If you need to use opioids long-term, talk to your care team about driving.    DO NOT operate heavy machinery    DO NOT do any other dangerous activities while taking these medicines.    DO NOT drink any alcohol while taking these medicines.     If the opioid prescribed includes acetaminophen, DO NOT take with any other medicines that contain acetaminophen. Read all labels carefully. Look for the word  acetaminophen  or  Tylenol.  Ask your pharmacist if you have questions or are unsure.    You can get addicted to pain medicines, especially if you have a history of addiction (chemical, alcohol or substance dependence). Talk to your care team about ways to reduce this risk.    All opioids tend to cause constipation. Drink plenty of water and eat foods that have a lot of fiber, such as fruits, vegetables, prune juice, apple juice and high-fiber cereal. Take a laxative (Miralax, milk of magnesia, Colace, Senna) if you don t move your bowels at least every other day. Other side effects include upset stomach, sleepiness, dizziness, throwing up, tolerance (needing more of the medicine to have the same effect), physical dependence and slowed breathing.    Store your pills in a secure place, locked if possible. We will not replace any lost or stolen medicine. If you don t finish your medicine, please throw away (dispose) as directed by your pharmacist. The [place] [employer] has more information about safe disposal: https://www.pca.[facility].[place]/living-green/managing-unwanted-medications        Prescriptions were sent or printed at these locations (1 Prescription)                   Other Prescriptions                Printed at Department/Unit printer (1 of 1)          HYDROmorphone (DILAUDID) 2 MG tablet                Procedures and tests performed during your visit     Procedure/Test Number of Times Performed    CBC with platelets differential 2    Comprehensive metabolic panel 2    EKG 12-lead, tracing only 1    HCG qualitative urine 1    Lipase 2    Troponin I 2    US Abdomen Limited 1      Orders Needing Specimen Collection     None      Pending Results     Date and Time Order Name Status Description    11/25/2018 0832 Troponin I In process     11/25/2018 0832 Lipase In process     11/25/2018 0832 Comprehensive metabolic panel In process     11/25/2018 0827 CBC with platelets differential In process     11/25/2018 0756 EKG 12-lead, tracing only Preliminary     11/25/2018 0756 US Abdomen Limited Preliminary             Pending Culture Results     No orders found from 11/23/2018 to 11/26/2018.            Pending Results Instructions     If you had any lab results that were not finalized at the time of your Discharge, you can call the ED Lab Result RN at [phone]. You will be contacted by this team for any positive Lab results or changes in treatment. The nurses are available 7 days a week from 10A to 6:30P.  You can leave a message 24 hours per day and they will return your call.        Test Results From Your Hospital Stay                                11/25/2018  9:00 AM      Component Results     Component Value Ref Range & Units Status    HCG Qual Urine Negative NEG^Negative Final    This test is for screening purposes.  Results should be interpreted along with   the clinical picture.  Confirmation testing is available if warranted by   ordering TTP067, HCG Quantitative Pregnancy.           11/25/2018  9:04 AM      Narrative     US ABDOMEN LIMITED   11/25/2018 9:00 AM     HISTORY:  Right upper quadrant pain     COMPARISON: None.    FINDINGS:    Gallbladder: Positive sonographic Lo's sign. Multiple gallstones.  No significant gallbladder wall thickening  identified.    Bile ducts:  Common duct is 0.4 cm. No intrahepatic biliary ductal  dilatation identified.    Liver: Normal.     Pancreas:  Partially obscured but grossly unremarkable.     Right kidney:  Normal.     Aorta and IVC:  Not specifically assessed.         Impression     IMPRESSION:  Positive sonographic Lo's sign. Multiple gallstones.  As such, cholecystitis is not excluded. No biliary dilatation.         11/25/2018  8:27 AM         11/25/2018  8:33 AM         11/25/2018  8:33 AM         11/25/2018  8:33 AM         11/25/2018  9:32 AM      Component Results     Component Value Ref Range & Units Status    WBC 8.9 4.0 - 11.0 10e9/L Final    RBC Count 4.21 3.8 - 5.2 10e12/L Final    Hemoglobin 13.3 11.7 - 15.7 g/dL Final    Hematocrit 39.5 35.0 - 47.0 % Final    MCV 94 78 - 100 fl Final    MCH 31.6 26.5 - 33.0 pg Final    MCHC 33.7 31.5 - 36.5 g/dL Final    RDW 11.9 10.0 - 15.0 % Final    Platelet Count 252 150 - 450 10e9/L Final    Diff Method Automated Method  Final    % Neutrophils 61.4 % Final    % Lymphocytes 28.7 % Final    % Monocytes 8.0 % Final    % Eosinophils 1.3 % Final    % Basophils 0.4 % Final    % Immature Granulocytes 0.2 % Final    Nucleated RBCs 0 0 /100 Final    Absolute Neutrophil 5.5 1.6 - 8.3 10e9/L Final    Absolute Lymphocytes 2.6 0.8 - 5.3 10e9/L Final    Absolute Monocytes 0.7 0.0 - 1.3 10e9/L Final    Absolute Eosinophils 0.1 0.0 - 0.7 10e9/L Final    Absolute Basophils 0.0 0.0 - 0.2 10e9/L Final    Abs Immature Granulocytes 0.0 0 - 0.4 10e9/L Final    Absolute Nucleated RBC 0.0  Final         11/25/2018  9:50 AM      Component Results     Component Value Ref Range & Units Status    Sodium 141 133 - 144 mmol/L Final    Potassium 3.8 3.4 - 5.3 mmol/L Final    Chloride 109 94 - 109 mmol/L Final    Carbon Dioxide 25 20 - 32 mmol/L Final    Anion Gap 7 3 - 14 mmol/L Final    Glucose 101 (H) 70 - 99 mg/dL Final    Urea Nitrogen 12 7 - 30 mg/dL Final    Creatinine 0.73 0.52 - 1.04  mg/dL Final    GFR Estimate >90 >60 mL/min/1.7m2 Final    Non  GFR Calc    GFR Estimate If Black >90 >60 mL/min/1.7m2 Final    African American GFR Calc    Calcium 8.3 (L) 8.5 - 10.1 mg/dL Final    Bilirubin Total 1.0 0.2 - 1.3 mg/dL Final    Albumin 3.4 3.4 - 5.0 g/dL Final    Protein Total 6.5 (L) 6.8 - 8.8 g/dL Final    Alkaline Phosphatase 41 40 - 150 U/L Final    ALT 24 0 - 50 U/L Final    AST 10 0 - 45 U/L Final         11/25/2018  9:50 AM      Component Results     Component Value Ref Range & Units Status    Lipase 109 73 - 393 U/L Final         11/25/2018  9:50 AM      Component Results     Component Value Ref Range & Units Status    Troponin I ES <0.015 0.000 - 0.045 ug/L Final    The 99th percentile for upper reference range is 0.045 ug/L.  Troponin values   in the range of 0.045 - 0.120 ug/L may be associated with risks of adverse   clinical events.                  Clinical Quality Measure: Blood Pressure Screening     Your blood pressure was checked while you were in the emergency department today. The last reading we obtained was  BP: 122/80 . Please read the guidelines below about what these numbers mean and what you should do about them.  If your systolic blood pressure (the top number) is less than 120 and your diastolic blood pressure (the bottom number) is less than 80, then your blood pressure is normal. There is nothing more that you need to do about it.  If your systolic blood pressure (the top number) is 120-139 or your diastolic blood pressure (the bottom number) is 80-89, your blood pressure may be higher than it should be. You should have your blood pressure rechecked within a year by a primary care provider.  If your systolic blood pressure (the top number) is 140 or greater or your diastolic blood pressure (the bottom number) is 90 or greater, you may have high blood pressure. High blood pressure is treatable, but if left untreated over time it can put you at risk for  heart attack, stroke, or kidney failure. You should have your blood pressure rechecked by a primary care provider within the next 4 weeks.  If your provider in the emergency department today gave you specific instructions to follow-up with your doctor or provider even sooner than that, you should follow that instruction and not wait for up to 4 weeks for your follow-up visit.        Thank you for choosing [facility]       Thank you for choosing [facility] for your care. Our goal is always to provide you with excellent care. Hearing back from our patients is one way we can continue to improve our services. Please take a few minutes to complete the written survey that you may receive in the mail after you visit with us. Thank you!        [employer] Information     [employer] gives you secure access to your electronic health record. If you see a primary care provider, you can also send messages to your care team and make appointments. If you have questions, please call your primary care clinic.  If you do not have a primary care provider, please call [phone] and they will assist you.        Care EveryWhere ID     This is your Care EveryWhere ID. This could be used by other organizations to access your [facility] medical records  [id number]        Equal Access to Services     PALOMO MCGEE : Candace Bowens, kiko jules, madison hancock. So [facility] [phone].    ATENCIÓN: Si habla español, tiene a bedoya disposición servicios gratuitos de asistencia lingüística. [facility] [phone].    We comply with applicable federal civil rights laws and [place] laws. We do not discriminate on the basis of race, color, national origin, age, disability, sex, sexual orientation, or gender identity.            After Visit Summary       This is your record. Keep this with you and show to your community pharmacist(s) and doctor(s) at your next visit.

## 2018-11-25 NOTE — ED AVS SNAPSHOT
[facility] Emergency Department    [address]    [facility] [address]    Phone:  [phone]    Fax:  [phone]                                       Neetu Bella   MRN: [id number]    Department:  [facility] Emergency Department   Date of Visit:  11/25/2018           After Visit Summary Signature Page     I have received my discharge instructions, and my questions have been answered. I have discussed any challenges I see with this plan with the nurse or doctor.    ..........................................................................................................................................  Patient/Patient Representative Signature      ..........................................................................................................................................  Patient Representative Print Name and Relationship to Patient    ..................................................               ................................................  Date                                   Time    ..........................................................................................................................................  Reviewed by Signature/Title    ...................................................              ..............................................  Date                                               Time          22EPIC Rev 08/18

## 2018-11-26 ENCOUNTER — OFFICE VISIT (OUTPATIENT)
Dept: SURGERY | Facility: CLINIC | Age: 33
End: 2018-11-26
Payer: COMMERCIAL

## 2018-11-26 ENCOUNTER — TELEPHONE (OUTPATIENT)
Dept: SURGERY | Facility: CLINIC | Age: 33
End: 2018-11-26

## 2018-11-26 VITALS
SYSTOLIC BLOOD PRESSURE: 104 MMHG | OXYGEN SATURATION: 94 % | DIASTOLIC BLOOD PRESSURE: 72 MMHG | BODY MASS INDEX: 39.65 KG/M2 | WEIGHT: 238 LBS | HEIGHT: 65 IN | HEART RATE: 78 BPM | RESPIRATION RATE: 16 BRPM

## 2018-11-26 DIAGNOSIS — K80.20 GALLSTONES: Primary | ICD-10-CM

## 2018-11-26 LAB — INTERPRETATION ECG - MUSE: NORMAL

## 2018-11-26 PROCEDURE — 99204 OFFICE O/P NEW MOD 45 MIN: CPT | Performed by: SURGERY

## 2018-11-26 ASSESSMENT — ENCOUNTER SYMPTOMS
CHANGE IN BOWEL HABIT: 1
CONSTIPATION: 1
ABDOMINAL PAIN: 1
NAUSEA: 1
VOMITING: 1

## 2018-11-26 NOTE — MR AVS SNAPSHOT
After Visit Summary   11/26/2018    Neetu Bella    MRN: [id number]           Patient Information     Date Of Birth          1985        Visit Information        Provider Department      11/26/2018 3:30 PM Prema Salmeron MD [facility]      Today's Diagnoses     Gallstones    -  1       Follow-ups after your visit        Your next 10 appointments already scheduled     Nov 27, 2018   Procedure with Prema Salmeron MD   [facility] (--)    [address]  [facility] [address]   [phone]            Nov 27, 2018  1:00 PM [facility] Same Day Surgery with Prema Salmeron MD   [facility] ([facility])    [facility] ([facility])   [phone]              Who to contact     If you have questions or need follow up information about today's clinic visit or your schedule please contact [facility] directly at [phone].  Normal or non-critical lab and imaging results will be communicated to you by [employer], letter or phone within 4 business days after the clinic has received the results. If you do not hear from us within 7 days, please contact the clinic through [employer] or phone. If you have a critical or abnormal lab result, we will notify you by phone as soon as possible.  Submit refill requests through [employer] or call your pharmacy and they will forward the refill request to us. Please allow 3 business days for your refill to be completed.          Additional Information About Your Visit        [employer] Information     [employer] gives you secure access to your electronic health record. If you see a primary care provider, you can also send messages to your care team and make appointments. If you have questions, please call your primary care clinic.  If you do  "not have a primary care provider, please call [phone] and they will assist you.        Care EveryWhere ID     This is your Care EveryWhere ID. This could be used by other organizations to access your [facility] medical records  [id number]        Your Vitals Were     Pulse Respirations Height Last Period Pulse Oximetry BMI (Body Mass Index)    78 16 5' 5\" (1.651 m) 11/01/2018 94% 39.61 kg/m2       Blood Pressure from Last 3 Encounters:   11/26/18 104/72   11/25/18 116/68   09/19/18 141/87    Weight from Last 3 Encounters:   11/26/18 238 lb (108 kg)   09/19/18 238 lb 4 oz (108.1 kg)   08/15/16 228 lb 8 oz (103.6 kg)              Today, you had the following     No orders found for display       Primary Care Provider Fax #    Physician No Ref-Primary [phone]       No address on file        Equal Access to Services     PALOMO MCGEE : Hadii darrell Bowens, wasarah jules, qaybjoaquin kaalmada aparna, madison galicia . So [facility] [phone].    ATENCIÓN: Si habla español, tiene a bedoya disposición servicios gratuitos de asistencia lingüística. Llame al [phone].    We comply with applicable federal civil rights laws and [place] laws. We do not discriminate on the basis of race, color, national origin, age, disability, sex, sexual orientation, or gender identity.            Thank you!     Thank you for choosing [facility]  for your care. Our goal is always to provide you with excellent care. Hearing back from our patients is one way we can continue to improve our services. Please take a few minutes to complete the written survey that you may receive in the mail after your visit with us. Thank you!             Your Updated Medication List - Protect others around you: Learn how to safely use, store and throw away your medicines at www.disposemymeds.org.          This list is accurate as of 11/26/18  3:56 PM.  Always use your most recent med list.             "       Brand Name Dispense Instructions for use Diagnosis    HYDROmorphone 2 MG tablet    DILAUDID    10 tablet    Take 1 tablet (2 mg) by mouth every 6 hours as needed for pain        ondansetron 4 MG ODT tab    ZOFRAN ODT    10 tablet    Take 1 tablet (4 mg) by mouth every 8 hours as needed for nausea        prenatal multivitamin plus iron 27-0.8 MG Tabs per tablet      Take 1 tablet by mouth daily

## 2018-11-26 NOTE — H&P (VIEW-ONLY)
History     Chief Complaint:  Abdominal Pain      HPI   Neetu Bella is a 33 year old female who presents with post-prandial abdominal pain in the RUQ. Pain has been getting worse since about one week ago, and now she states it is a 7 or 8 out of 10 on the pain scale. She describes this as a constant pain, not throbbing or stabbing. The pain radiates to the back and shoulder blade. She also endorses three episodes of vomiting yesterday. In March she was seen for similar symptoms and was told she had some gas in the colon. Patient was seen here for vaginal bleeding and a miscarriage on 9/19/18. Patient has not had a cholecystectomy. She denies chest pain.     Cardiac Risk Factors   Sex: Female   Tobacco: Negative   Hypertension: Negative  Diabetes: Negative  Hyperlipidemia: Negative  Family History: Negative    Allergies:  Codeine     Medications:    Prenatal vitamin     Past Medical History:    History reviewed.  No significant past medical history.     Past Surgical History:    [place] teeth  Tonsillectomy    Family History:    Skin cancer  HTN  HLD    Social History:  Presents to the ED with her wife.   Tobacco Use: Never  Alcohol Use: Rarely  PCP: Marcia Sin     Review of Systems   Gastrointestinal: Positive for abdominal pain and vomiting.   Musculoskeletal: Positive for back pain.   All other systems reviewed and are negative.      Physical Exam   First Vitals:  BP: 138/87  Heart Rate: 84  Temp: 98  F (36.7  C)  Resp: 20  SpO2: 98 %      Physical Exam  General: The patient is alert, in no respiratory distress.    HENT: Mucous membranes moist.    Cardiovascular: Regular rate and rhythm. Good pulses in all four extremities. Normal capillary refill and skin turgor.     Respiratory: Lungs are clear. No nasal flaring. No retractions. No wheezing, no crackles.    Gastrointestinal: Abdomen soft. No guarding, no rebound. No palpable hernias. Right upper quadrant tenderness.      Musculoskeletal: No gross deformity.     Skin: No rashes or petechiae.     Neurologic: The patient is alert and oriented x3. GCS 15. No testable cranial nerve deficit. Follows commands with clear and appropriate speech. Gives appropriate answers. Good strength in all extremities. No gross neurologic deficit. Gross sensation intact. Pupils are round and reactive. No meningismus.     Lymphatic: No cervical adenopathy. No lower extremity swelling.    Psychiatric: The patient is non-tearful.    Emergency Department Course     EKG:  @ 0800  Indication: RUQ abdominal pain  Vent. Rate 71 bpm. TN interval 134 ms. QRS duration 102 ms. QT/QTc 408/443 ms. P-R-T axis 17 40 29.   Normal sinus rhythm. Normal ECG.    Read @ 0810 by Dr. Fraire.    Imaging:  Radiographic findings were communicated with the patient who voiced understanding of the findings.    US Abdomen Limited   Preliminary Result   IMPRESSION:  Positive sonographic Lo's sign. Multiple gallstones.   As such, cholecystitis is not excluded. No biliary dilatation.        Laboratory:  CBC:  WBC 8.9, HGB 13.3, , otherwise WNL  CMP: Glucose 101 (H), calcium 8.3 (L), protein 6.5 (L), otherwise WNL (Creatinine 0.73)  Lipase: 109  Troponin: <0.015  HCG urine: Negative    Interventions:  0820: Normal Saline, 1 liter, IV bolus   0820: Zofran, 4 mg, IV injection   0820: Dilaudid, 0.5 mg, IV injection   0950: Dilaudid, 0.5 mg, IV injection     Emergency Department Course:  The patient arrived in triage where vitals were measured and recorded.   The patient was then escorted back to the emergency department.   The patient's medical records were reviewed.  Nursing notes and vitals were reviewed.  0749: I performed an exam of the patient as documented above.  The above workup was undertaken.  0925: I rechecked the patient and discussed results.  Findings and plan explained to the Patient. Patient discharged home, status improved, with instructions regarding  supportive care, medications, and reasons to return as well as the importance of close follow-up was reviewed. Patient was prescribed Dilaudid.     Impression & Plan      Medical Decision Making:  The patient presented a very classic story for biliary colic, RUQ abdominal pain. She is tender in that area. There were gallstones and a positive sonographic Lo's sign but no signs of thickened wall. Her white count was normal. LFTs were within normal limits. I do not feel there is likely cholecystitis. She felt comfortable following up with the surgeon, was discharged home in good condition. Of note, there is no evidence of cardiac disease or pregnancy.    Diagnosis:    ICD-10-CM    1. RUQ abdominal pain R10.11    2. Gallstones K80.20        Disposition:  Discharged to home.     Discharge Medications:  New Prescriptions    HYDROMORPHONE (DILAUDID) 2 MG TABLET    Take 1 tablet (2 mg) by mouth every 6 hours as needed for pain         Audrey ELIAS, am serving as a scribe on 11/25/2018 at 7:49 AM to personally document services performed by Dr. Fraire based on my observations and the provider's statements to me.   [facility] EMERGENCY DEPARTMENT       Juan Fraire MD  11/25/18 9829

## 2018-11-26 NOTE — PROGRESS NOTES
HPI      ROS (Review of Systems):     GASTROINTESTINAL: Positive for nausea, vomiting, abdominal pain, constipation and change in bowel habit.   MUSCULOSKELETAL: Positive for back pain.          Physical Exam      Assessment and Plan:    Neetu Bella is a 33 year old female seen in consultation for Abdominal pain, right upper quadrant at the request of Physician No Ref-Primary.    It is my impression that Neetu has symptomatic cholelithiasis.   I have offered her a laparoscopic cholecystectomy with intraoperative cholangiogram.      We have discussed the indication, alternatives, risks and expected recovery.  Specifically we have discussed incisions, scarring, postoperative infections, anesthesia, bleeding, blood transfusion, open conversion, common bile duct injury, injury to intra-abdominal organs, adhesions that can lead to bowel obstruction, retained common bile duct stone, bile leak, DVT, PE, hernia, post cholecystectomy diarrhea, postoperative dietary restrictions and physical limitations.  We have discussed the recommended interventions and treatments for these complications.  All questions have been answered to the best of my ability.         Comorbidities:  None  Of note the patient is planning on attempting to become pregnant in the future.  Is not actively trying currently.    We will schedule surgery at the patient's convenience.      Chief complaint:  Abdominal pain, right upper quadrant    HPI:  Neetu Bella is a 33 year old female who presents with intermittent right upper quadrant pain for 7 months.  The pain is associated with eating fatty foods.  Positive for associated symptoms of nausea, vomiting and chills.  Negative for associated symptoms of vomiting and diarrhea.  She does not have a history of jaundice or dark urine.  She  has not had pancreatitis in the past.        Past Medical History:   has no past medical history on file.    Past Surgical History:  Past  "Surgical History:   Procedure Laterality Date     ENT SURGERY         Social History:  Social History     Social History     Marital status:      Spouse name: N/A     Number of children: N/A     Years of education: N/A     Occupational History     Not on file.     Social History Main Topics     Smoking status: Never Smoker     Smokeless tobacco: Never Used     Alcohol use No      Comment: Rare      Drug use: No     Sexual activity: Yes     Partners: Female     Other Topics Concern     Not on file     Social History Narrative        Family History:  Family History   Problem Relation Age of Onset     Brain Tumor Maternal Aunt      Brain Cancer Maternal Aunt      Skin Cancer Father      Hypertension Father      Hyperlipidemia Father      Substance Abuse Father      Skin Cancer Maternal Grandmother      Hypertension Maternal Grandmother      Skin Cancer Paternal Grandfather      Hypertension Mother      Anesthesia Reaction Mother      Hypertension Sister      Hyperlipidemia Sister      Hyperlipidemia Maternal Grandfather      Hypertension Brother      Family history reviewed and is not pertinent    Review of Systems:  The 10 point review of systems is negative other than noted in the HPI and above.    Physical Exam:  Vitals: /72 (BP Location: Right arm, Cuff Size: Adult Large)  Pulse 78  Resp 16  Ht 5' 5\" (1.651 m)  Wt 238 lb (108 kg)  LMP 11/01/2018  SpO2 94%  BMI 39.61 kg/m2  BMI= Body mass index is 39.61 kg/(m^2).  General - Well developed, well nourished female in no apparent distress  HEENT:  Head normocephalic and atraumatic, pupils equal and round, conjunctivae clear, no scleral icterus, mucous membranes moist, external ears and nose normal  Neck: Supple without thyromegaly or masses  Lymphatic: No cervical, or supraclavicular lymphadenopathy  Pulmonary: Clear to auscultation bilaterally  Abdomen:   soft, rounded, non-distended with mild tenderness noted in the right upper quadrant . no masses " palpated.  Extremities: Warm without edema  Musculoskeletal:  Normal station and gait  Neurologic: alert, speech is clear, moves all extremities with good strength  Psychiatric: Mood and affect appropriate  Skin: Without lesions, rashes or juandice    Relevant labs:    WBC -   Lab Results   Component Value Date    WBC 8.9 11/25/2018       HgB -   Lab Results   Component Value Date    HGB 13.3 11/25/2018       Plt-   Lab Results   Component Value Date     11/25/2018       Liver Function Studies -   Recent Labs   Lab Test  11/25/18   0928   PROTTOTAL  6.5*   ALBUMIN  3.4   BILITOTAL  1.0   ALKPHOS  41   AST  10   ALT  24       Lipase-   Lab Results   Component Value Date    LIPASE 109 11/25/2018           Imaging:  All imaging studies reviewed by me.        Recent Results (from the past 744 hour(s))   US Abdomen Limited    Narrative    US ABDOMEN LIMITED   11/25/2018 9:00 AM     HISTORY:  Right upper quadrant pain     COMPARISON: None.    FINDINGS:    Gallbladder: Positive sonographic Lo's sign. Multiple gallstones.  No significant gallbladder wall thickening identified.    Bile ducts:  Common duct is 0.4 cm. No intrahepatic biliary ductal  dilatation identified.    Liver: Normal.     Pancreas:  Partially obscured but grossly unremarkable.     Right kidney:  Normal.     Aorta and IVC:  Not specifically assessed.       Impression    IMPRESSION:  Positive sonographic Lo's sign. Multiple gallstones.  As such, cholecystitis is not excluded. No biliary dilatation.    AMOL WALTERS MD         This note was created using voice recognition software. Undetected word substitutions or other errors may have occurred.     Time spent with the patient with greater that 50% of the time in discussion was 20 minutes.     Prema Salmeron MD  [facility] [place]    Please route or send letter to:  Primary Care Provider (PCP) and Referring Provider

## 2018-11-27 ENCOUNTER — SURGERY (OUTPATIENT)
Age: 33
End: 2018-11-27

## 2018-11-27 ENCOUNTER — OFFICE VISIT (OUTPATIENT)
Dept: SURGERY | Facility: PHYSICIAN GROUP | Age: 33
End: 2018-11-27
Payer: COMMERCIAL

## 2018-11-27 ENCOUNTER — ANESTHESIA EVENT (OUTPATIENT)
Dept: SURGERY | Facility: CLINIC | Age: 33
End: 2018-11-27
Payer: COMMERCIAL

## 2018-11-27 ENCOUNTER — APPOINTMENT (OUTPATIENT)
Dept: GENERAL RADIOLOGY | Facility: CLINIC | Age: 33
End: 2018-11-27
Attending: SURGERY
Payer: COMMERCIAL

## 2018-11-27 ENCOUNTER — HOSPITAL ENCOUNTER (OUTPATIENT)
Facility: CLINIC | Age: 33
Discharge: HOME | End: 2018-11-27
Attending: SURGERY | Admitting: SURGERY
Payer: COMMERCIAL

## 2018-11-27 ENCOUNTER — ANESTHESIA (OUTPATIENT)
Dept: SURGERY | Facility: CLINIC | Age: 33
End: 2018-11-27
Payer: COMMERCIAL

## 2018-11-27 VITALS
TEMPERATURE: 98 F | WEIGHT: 238 LBS | OXYGEN SATURATION: 98 % | RESPIRATION RATE: 16 BRPM | HEIGHT: 65 IN | SYSTOLIC BLOOD PRESSURE: 128 MMHG | DIASTOLIC BLOOD PRESSURE: 55 MMHG | BODY MASS INDEX: 39.65 KG/M2

## 2018-11-27 DIAGNOSIS — K80.20 GALLSTONES: Primary | ICD-10-CM

## 2018-11-27 LAB — HCG UR QL: NEGATIVE

## 2018-11-27 PROCEDURE — 25000128 H RX IP 250 OP 636: Performed by: ANESTHESIOLOGY

## 2018-11-27 PROCEDURE — 40000306 ZZH STATISTIC PRE PROC ASSESS II: Performed by: SURGERY

## 2018-11-27 PROCEDURE — 47563 LAPARO CHOLECYSTECTOMY/GRAPH: CPT | Performed by: SURGERY

## 2018-11-27 PROCEDURE — 25800025 ZZH RX 258: Performed by: SURGERY

## 2018-11-27 PROCEDURE — 27210794 ZZH OR GENERAL SUPPLY STERILE: Performed by: SURGERY

## 2018-11-27 PROCEDURE — 36000058 ZZH SURGERY LEVEL 3 EA 15 ADDTL MIN: Performed by: SURGERY

## 2018-11-27 PROCEDURE — 25000132 ZZH RX MED GY IP 250 OP 250 PS 637: Performed by: ANESTHESIOLOGY

## 2018-11-27 PROCEDURE — 25000566 ZZH SEVOFLURANE, EA 15 MIN: Performed by: SURGERY

## 2018-11-27 PROCEDURE — 81025 URINE PREGNANCY TEST: CPT | Performed by: ANESTHESIOLOGY

## 2018-11-27 PROCEDURE — 40000277 XR SURGERY CARM FLUORO LESS THAN 5 MIN W STILLS

## 2018-11-27 PROCEDURE — 37000009 ZZH ANESTHESIA TECHNICAL FEE, EACH ADDTL 15 MIN: Performed by: SURGERY

## 2018-11-27 PROCEDURE — 37000008 ZZH ANESTHESIA TECHNICAL FEE, 1ST 30 MIN: Performed by: SURGERY

## 2018-11-27 PROCEDURE — 36000060 ZZH SURGERY LEVEL 3 W FLUORO 1ST 30 MIN: Performed by: SURGERY

## 2018-11-27 PROCEDURE — 25000125 ZZHC RX 250: Performed by: SURGERY

## 2018-11-27 PROCEDURE — 88304 TISSUE EXAM BY PATHOLOGIST: CPT | Mod: 26 | Performed by: SURGERY

## 2018-11-27 PROCEDURE — 25000128 H RX IP 250 OP 636: Performed by: SURGERY

## 2018-11-27 PROCEDURE — 25000128 H RX IP 250 OP 636: Performed by: NURSE ANESTHETIST, CERTIFIED REGISTERED

## 2018-11-27 PROCEDURE — 71000012 ZZH RECOVERY PHASE 1 LEVEL 1 FIRST HR: Performed by: SURGERY

## 2018-11-27 PROCEDURE — 71000027 ZZH RECOVERY PHASE 2 EACH 15 MINS: Performed by: SURGERY

## 2018-11-27 PROCEDURE — 88304 TISSUE EXAM BY PATHOLOGIST: CPT | Performed by: SURGERY

## 2018-11-27 PROCEDURE — 25000125 ZZHC RX 250: Performed by: NURSE ANESTHETIST, CERTIFIED REGISTERED

## 2018-11-27 PROCEDURE — 25500064 ZZH RX 255 OP 636: Performed by: SURGERY

## 2018-11-27 PROCEDURE — 71000013 ZZH RECOVERY PHASE 1 LEVEL 1 EA ADDTL HR: Performed by: SURGERY

## 2018-11-27 PROCEDURE — 47563 LAPARO CHOLECYSTECTOMY/GRAPH: CPT | Mod: AS | Performed by: PHYSICIAN ASSISTANT

## 2018-11-27 RX ORDER — LIDOCAINE HYDROCHLORIDE 10 MG/ML
INJECTION, SOLUTION INFILTRATION; PERINEURAL PRN
Status: DISCONTINUED | OUTPATIENT
Start: 2018-11-27 | End: 2018-11-27

## 2018-11-27 RX ORDER — ALBUTEROL SULFATE 0.83 MG/ML
2.5 SOLUTION RESPIRATORY (INHALATION) EVERY 4 HOURS PRN
Status: DISCONTINUED | OUTPATIENT
Start: 2018-11-27 | End: 2018-11-27 | Stop reason: HOSPADM

## 2018-11-27 RX ORDER — ONDANSETRON 2 MG/ML
INJECTION INTRAMUSCULAR; INTRAVENOUS PRN
Status: DISCONTINUED | OUTPATIENT
Start: 2018-11-27 | End: 2018-11-27

## 2018-11-27 RX ORDER — ONDANSETRON 4 MG/1
4 TABLET, ORALLY DISINTEGRATING ORAL
Status: DISCONTINUED | OUTPATIENT
Start: 2018-11-27 | End: 2018-11-27 | Stop reason: HOSPADM

## 2018-11-27 RX ORDER — FENTANYL CITRATE 50 UG/ML
INJECTION, SOLUTION INTRAMUSCULAR; INTRAVENOUS PRN
Status: DISCONTINUED | OUTPATIENT
Start: 2018-11-27 | End: 2018-11-27

## 2018-11-27 RX ORDER — DEXAMETHASONE SODIUM PHOSPHATE 4 MG/ML
INJECTION, SOLUTION INTRA-ARTICULAR; INTRALESIONAL; INTRAMUSCULAR; INTRAVENOUS; SOFT TISSUE PRN
Status: DISCONTINUED | OUTPATIENT
Start: 2018-11-27 | End: 2018-11-27

## 2018-11-27 RX ORDER — SODIUM CHLORIDE, SODIUM LACTATE, POTASSIUM CHLORIDE, CALCIUM CHLORIDE 600; 310; 30; 20 MG/100ML; MG/100ML; MG/100ML; MG/100ML
INJECTION, SOLUTION INTRAVENOUS CONTINUOUS
Status: DISCONTINUED | OUTPATIENT
Start: 2018-11-27 | End: 2018-11-27 | Stop reason: HOSPADM

## 2018-11-27 RX ORDER — OXYCODONE HYDROCHLORIDE 5 MG/1
5 TABLET ORAL EVERY 4 HOURS PRN
Status: DISCONTINUED | OUTPATIENT
Start: 2018-11-27 | End: 2018-11-27 | Stop reason: HOSPADM

## 2018-11-27 RX ORDER — NEOSTIGMINE METHYLSULFATE 1 MG/ML
VIAL (ML) INJECTION PRN
Status: DISCONTINUED | OUTPATIENT
Start: 2018-11-27 | End: 2018-11-27

## 2018-11-27 RX ORDER — CEFAZOLIN SODIUM 2 G/100ML
2 INJECTION, SOLUTION INTRAVENOUS
Status: COMPLETED | OUTPATIENT
Start: 2018-11-27 | End: 2018-11-27

## 2018-11-27 RX ORDER — TAMSULOSIN HYDROCHLORIDE 0.4 MG/1
0.4 CAPSULE ORAL
Status: DISCONTINUED | OUTPATIENT
Start: 2018-11-27 | End: 2018-11-27 | Stop reason: HOSPADM

## 2018-11-27 RX ORDER — HYDRALAZINE HYDROCHLORIDE 20 MG/ML
2.5-5 INJECTION INTRAMUSCULAR; INTRAVENOUS EVERY 10 MIN PRN
Status: DISCONTINUED | OUTPATIENT
Start: 2018-11-27 | End: 2018-11-27 | Stop reason: HOSPADM

## 2018-11-27 RX ORDER — NALOXONE HYDROCHLORIDE 0.4 MG/ML
.1-.4 INJECTION, SOLUTION INTRAMUSCULAR; INTRAVENOUS; SUBCUTANEOUS
Status: DISCONTINUED | OUTPATIENT
Start: 2018-11-27 | End: 2018-11-27 | Stop reason: HOSPADM

## 2018-11-27 RX ORDER — HYDROMORPHONE HYDROCHLORIDE 1 MG/ML
.3-.5 INJECTION, SOLUTION INTRAMUSCULAR; INTRAVENOUS; SUBCUTANEOUS EVERY 10 MIN PRN
Status: DISCONTINUED | OUTPATIENT
Start: 2018-11-27 | End: 2018-11-27 | Stop reason: HOSPADM

## 2018-11-27 RX ORDER — FENTANYL CITRATE 50 UG/ML
25-50 INJECTION, SOLUTION INTRAMUSCULAR; INTRAVENOUS
Status: DISCONTINUED | OUTPATIENT
Start: 2018-11-27 | End: 2018-11-27 | Stop reason: HOSPADM

## 2018-11-27 RX ORDER — ONDANSETRON 4 MG/1
4-8 TABLET, ORALLY DISINTEGRATING ORAL EVERY 8 HOURS PRN
Qty: 4 TABLET | Refills: 0 | Status: SHIPPED | OUTPATIENT
Start: 2018-11-27 | End: 2018-12-04

## 2018-11-27 RX ORDER — GLYCOPYRROLATE 0.2 MG/ML
INJECTION, SOLUTION INTRAMUSCULAR; INTRAVENOUS PRN
Status: DISCONTINUED | OUTPATIENT
Start: 2018-11-27 | End: 2018-11-27

## 2018-11-27 RX ORDER — METOPROLOL TARTRATE 1 MG/ML
1-2 INJECTION, SOLUTION INTRAVENOUS EVERY 5 MIN PRN
Status: DISCONTINUED | OUTPATIENT
Start: 2018-11-27 | End: 2018-11-27 | Stop reason: HOSPADM

## 2018-11-27 RX ORDER — HYDROMORPHONE HYDROCHLORIDE 2 MG/1
2-4 TABLET ORAL EVERY 4 HOURS PRN
Qty: 8 TABLET | Refills: 0 | Status: SHIPPED | OUTPATIENT
Start: 2018-11-27 | End: 2018-12-04

## 2018-11-27 RX ORDER — IBUPROFEN 600 MG/1
600 TABLET, FILM COATED ORAL EVERY 6 HOURS PRN
Qty: 30 TABLET | Refills: 1 | COMMUNITY
Start: 2018-11-27

## 2018-11-27 RX ORDER — LIDOCAINE 40 MG/G
CREAM TOPICAL
Status: DISCONTINUED | OUTPATIENT
Start: 2018-11-27 | End: 2018-11-27 | Stop reason: HOSPADM

## 2018-11-27 RX ORDER — ONDANSETRON 2 MG/ML
4 INJECTION INTRAMUSCULAR; INTRAVENOUS EVERY 30 MIN PRN
Status: DISCONTINUED | OUTPATIENT
Start: 2018-11-27 | End: 2018-11-27 | Stop reason: HOSPADM

## 2018-11-27 RX ORDER — IBUPROFEN 600 MG/1
600 TABLET, FILM COATED ORAL
Status: DISCONTINUED | OUTPATIENT
Start: 2018-11-27 | End: 2018-11-27 | Stop reason: HOSPADM

## 2018-11-27 RX ORDER — PROPOFOL 10 MG/ML
INJECTION, EMULSION INTRAVENOUS PRN
Status: DISCONTINUED | OUTPATIENT
Start: 2018-11-27 | End: 2018-11-27

## 2018-11-27 RX ORDER — CEFAZOLIN SODIUM 1 G/3ML
1 INJECTION, POWDER, FOR SOLUTION INTRAMUSCULAR; INTRAVENOUS SEE ADMIN INSTRUCTIONS
Status: DISCONTINUED | OUTPATIENT
Start: 2018-11-27 | End: 2018-11-27 | Stop reason: HOSPADM

## 2018-11-27 RX ORDER — AMOXICILLIN 250 MG
1-2 CAPSULE ORAL 2 TIMES DAILY PRN
Qty: 30 TABLET | Refills: 0 | Status: SHIPPED | OUTPATIENT
Start: 2018-11-27 | End: 2018-12-04

## 2018-11-27 RX ORDER — MEPERIDINE HYDROCHLORIDE 50 MG/ML
12.5 INJECTION INTRAMUSCULAR; INTRAVENOUS; SUBCUTANEOUS
Status: DISCONTINUED | OUTPATIENT
Start: 2018-11-27 | End: 2018-11-27 | Stop reason: HOSPADM

## 2018-11-27 RX ORDER — ONDANSETRON 4 MG/1
4 TABLET, ORALLY DISINTEGRATING ORAL EVERY 30 MIN PRN
Status: DISCONTINUED | OUTPATIENT
Start: 2018-11-27 | End: 2018-11-27 | Stop reason: HOSPADM

## 2018-11-27 RX ORDER — FENTANYL CITRATE 50 UG/ML
25-50 INJECTION, SOLUTION INTRAMUSCULAR; INTRAVENOUS EVERY 5 MIN PRN
Status: DISCONTINUED | OUTPATIENT
Start: 2018-11-27 | End: 2018-11-27 | Stop reason: HOSPADM

## 2018-11-27 RX ORDER — KETOROLAC TROMETHAMINE 30 MG/ML
30 INJECTION, SOLUTION INTRAMUSCULAR; INTRAVENOUS EVERY 6 HOURS PRN
Status: DISCONTINUED | OUTPATIENT
Start: 2018-11-27 | End: 2018-11-27 | Stop reason: HOSPADM

## 2018-11-27 RX ORDER — BUPIVACAINE HYDROCHLORIDE 2.5 MG/ML
INJECTION, SOLUTION EPIDURAL; INFILTRATION; INTRACAUDAL PRN
Status: DISCONTINUED | OUTPATIENT
Start: 2018-11-27 | End: 2018-11-27 | Stop reason: HOSPADM

## 2018-11-27 RX ADMIN — DEXAMETHASONE SODIUM PHOSPHATE 4 MG: 4 INJECTION, SOLUTION INTRA-ARTICULAR; INTRALESIONAL; INTRAMUSCULAR; INTRAVENOUS; SOFT TISSUE at 13:37

## 2018-11-27 RX ADMIN — ONDANSETRON 4 MG: 2 INJECTION INTRAMUSCULAR; INTRAVENOUS at 14:45

## 2018-11-27 RX ADMIN — OXYCODONE HYDROCHLORIDE 5 MG: 5 TABLET ORAL at 16:21

## 2018-11-27 RX ADMIN — LIDOCAINE HYDROCHLORIDE 50 MG: 10 INJECTION, SOLUTION INFILTRATION; PERINEURAL at 13:37

## 2018-11-27 RX ADMIN — FENTANYL CITRATE 50 MCG: 50 INJECTION INTRAMUSCULAR; INTRAVENOUS at 15:02

## 2018-11-27 RX ADMIN — SODIUM CHLORIDE 400 ML: 900 IRRIGANT IRRIGATION at 14:30

## 2018-11-27 RX ADMIN — MIDAZOLAM 2 MG: 1 INJECTION INTRAMUSCULAR; INTRAVENOUS at 13:30

## 2018-11-27 RX ADMIN — ONDANSETRON 4 MG: 2 INJECTION INTRAMUSCULAR; INTRAVENOUS at 14:24

## 2018-11-27 RX ADMIN — PROPOFOL 200 MG: 10 INJECTION, EMULSION INTRAVENOUS at 13:37

## 2018-11-27 RX ADMIN — Medication 0.5 MG: at 15:35

## 2018-11-27 RX ADMIN — Medication 4 MG: at 14:25

## 2018-11-27 RX ADMIN — FENTANYL CITRATE 100 MCG: 50 INJECTION, SOLUTION INTRAMUSCULAR; INTRAVENOUS at 13:37

## 2018-11-27 RX ADMIN — FENTANYL CITRATE 50 MCG: 50 INJECTION, SOLUTION INTRAMUSCULAR; INTRAVENOUS at 13:42

## 2018-11-27 RX ADMIN — HYDROMORPHONE HYDROCHLORIDE 1 MG: 1 INJECTION, SOLUTION INTRAMUSCULAR; INTRAVENOUS; SUBCUTANEOUS at 14:08

## 2018-11-27 RX ADMIN — PROCHLORPERAZINE EDISYLATE 10 MG: 5 INJECTION INTRAMUSCULAR; INTRAVENOUS at 15:22

## 2018-11-27 RX ADMIN — ROCURONIUM BROMIDE 10 MG: 10 INJECTION INTRAVENOUS at 14:09

## 2018-11-27 RX ADMIN — FENTANYL CITRATE 50 MCG: 50 INJECTION, SOLUTION INTRAMUSCULAR; INTRAVENOUS at 13:58

## 2018-11-27 RX ADMIN — BUPIVACAINE HYDROCHLORIDE 30 ML: 2.5 INJECTION, SOLUTION EPIDURAL; INFILTRATION; INTRACAUDAL; PERINEURAL at 14:23

## 2018-11-27 RX ADMIN — FENTANYL CITRATE 50 MCG: 50 INJECTION INTRAMUSCULAR; INTRAVENOUS at 14:44

## 2018-11-27 RX ADMIN — GLYCOPYRROLATE 0.8 MG: 0.2 INJECTION, SOLUTION INTRAMUSCULAR; INTRAVENOUS at 14:25

## 2018-11-27 RX ADMIN — KETOROLAC TROMETHAMINE 30 MG: 30 INJECTION, SOLUTION INTRAMUSCULAR at 16:21

## 2018-11-27 RX ADMIN — ROCURONIUM BROMIDE 30 MG: 10 INJECTION INTRAVENOUS at 13:37

## 2018-11-27 RX ADMIN — CEFAZOLIN SODIUM 2 G: 2 INJECTION, SOLUTION INTRAVENOUS at 13:30

## 2018-11-27 RX ADMIN — ROCURONIUM BROMIDE 10 MG: 10 INJECTION INTRAVENOUS at 13:42

## 2018-11-27 RX ADMIN — SODIUM CHLORIDE 25 ML GIVEN: 900 IRRIGANT IRRIGATION at 14:11

## 2018-11-27 RX ADMIN — SODIUM CHLORIDE, POTASSIUM CHLORIDE, SODIUM LACTATE AND CALCIUM CHLORIDE: 600; 310; 30; 20 INJECTION, SOLUTION INTRAVENOUS at 13:30

## 2018-11-27 NOTE — ANESTHESIA PREPROCEDURE EVALUATION
PAC NOTE:       ANESTHESIA PRE EVALUATION:  Anesthesia Evaluation     . Pt has had prior anesthetic.     No history of anesthetic complications          ROS/MED HX    ENT/Pulmonary:  - neg pulmonary ROS    (-) sleep apnea   Neurologic:       Cardiovascular:  - neg cardiovascular ROS       METS/Exercise Tolerance:     Hematologic:         Musculoskeletal:         GI/Hepatic:        (-) GERD   Renal/Genitourinary:         Endo:         Psychiatric:         Infectious Disease:         Malignancy:         Other:                     Physical Exam      Airway   Mallampati: II  TM distance: >3 FB  Neck ROM: full    Dental     Cardiovascular   Rhythm and rate: regular and normal      Pulmonary    breath sounds clear to auscultation             Anesthesia Plan      History & Physical Review  History and physical reviewed and following examination; no interval change.    ASA Status:  3 .    NPO Status:  > 8 hours    Plan for General and ETT with Intravenous and Propofol induction. Maintenance will be Balanced.    PONV prophylaxis:  Ondansetron (or other 5HT-3) and Dexamethasone or Solumedrol       Postoperative Care  Postoperative pain management:  IV analgesics, Oral pain medications and Multi-modal analgesia.      Consents  Anesthetic plan, risks, benefits and alternatives discussed with:  Patient..                            .

## 2018-11-27 NOTE — ANESTHESIA CARE TRANSFER NOTE
Patient: Neetu Bella    Procedure(s):  LAPAROSCOPIC CHOLECYSTECTOMY WITH CHOLANGIOGRAMS    Diagnosis: Biliary colic  Diagnosis Additional Information: No value filed.    Anesthesia Type:   General     Note:  Airway :Face Mask  Patient transferred to:PACU  Comments: To PACU, adequate gas exchange, report to RN.Handoff Report: Identifed the Patient, Identified the Reponsible Provider, Reviewed the pertinent medical history, Discussed the surgical course, Reviewed Intra-OP anesthesia mangement and issues during anesthesia, Set expectations for post-procedure period and Allowed opportunity for questions and acknowledgement of understanding      Vitals: (Last set prior to Anesthesia Care Transfer)    CRNA VITALS  11/27/2018 1404 - 11/27/2018 1439      11/27/2018             Pulse: 107    SpO2: 99 %    Resp Rate (observed): 28                Electronically Signed By: APOORVA Lopez CRNA  November 27, 2018  2:39 PM

## 2018-11-27 NOTE — IP AVS SNAPSHOT
MRN:[id number]                      After Visit Summary   11/27/2018    Neetu Bella    MRN: [id number]           Thank you!     Thank you for choosing [facility] for your care. Our goal is always to provide you with excellent care. Hearing back from our patients is one way we can continue to improve our services. Please take a few minutes to complete the written survey that you may receive in the mail after you visit. If you would like to speak to someone directly about your visit please contact Patient Relations at [phone]. Thank you!          Patient Information     Date Of Birth          1985        About your hospital stay     You were admitted on:  November 27, 2018 You last received care in the:  [facility] PreOP/PostOP    You were discharged on:  November 27, 2018       Who to Call     For medical emergencies, please call 911.  For non-urgent questions about your medical care, please call your primary care provider or clinic, None  For questions related to your surgery, please call your surgery clinic        Attending Provider     Provider Specialty    Prema Salmeron MD Surgery       Primary Care Provider Fax #    Physician No Ref-Primary [phone]      After Care Instructions     Dressing       Keep dressing clean and dry.  Dressing / incisional care as instructed by RN and or Surgeon            Ice to affected area       Ice to operative site PRN                  Further instructions from your care team           HOME CARE FOLLOWING LAPAROSCOPIC CHOLECYSTECTOMY  SARA Davila, ASHLEE Perez R. O Donnell, J. Shaheen    INCISIONAL CARE:  Replace the bandage over your incisions until all drainage stops, or if more comfortable to have in place.  If present, leave the steri-strips (white paper tapes) in place for 14 days after surgery.  If Dermabond (a type of skin glue) is present, leave in place until it  wears/flakes off.     BATHING:  Avoid baths for 1 week after surgery.  Showers are okay.  You may wash your hair at any time.  Gently pat your incisions dry after bathing.    ACTIVITY:  Light Activity -- you may immediately be up and about as tolerated.  Driving -- you may drive when comfortable and off narcotic pain medications.  Light Work -- resume when comfortable off pain medications.  (If you can drive, you probably can work.)  Strenuous Work/Activity -- limit lifting to 20 pounds for 2 weeks.  Progressively increase with time.  Active Sports (running, biking, etc.) -- cautiously resume after 2 weeks.    DISCOMFORT:  Use pain medications as prescribed by your surgeon.  Take the pain medication with some food, when possible, to minimize side effects.  Intermittent use of ice packs at the incision sites may help during the first 48 hours.  Expect gradual improvement.  You may experience shoulder pain, which is due to the air placed within your abdomen during the procedure.  This is temporary and usually passes within 2 days.    DIET:  Drink plenty of fluids.  While taking pain medications, increase dietary fiber or add a fiber supplementation like Metamucil or Citrucel to help prevent constipation - a possible side effect of pain medications.  It is not uncommon to experience some bowel changes (loose stools or constipation) after surgery.  Your body has to adapt to you no longer having a gall bladder.  To help minimize this side effect, avoid fatty foods for the first week after surgery.  You may then slowly increase the amount of fatty foods in your diet.      NAUSEA:  If nauseated from the anesthetic/pain meds; rest in bed, get up cautiously with assistance, and drink clear liquids (juice, tea, broth).    RETURN APPOINTMENT:  Schedule a follow-up visit 2-3 weeks post-op.  Office Phone:  [phone]     CONTACT US IF THE FOLLOWING DEVELOPS:   1. A fever that is above 101     2. If there is a large amount of  drainage, bleeding, or swelling.   3. Severe pain that is not relieved by your prescription.   4. Drainage that is thick, cloudy, yellow, green or white.   5. Any other questions not answered by  Frequently Asked Questions  sheet.      FREQUENTLY ASKED QUESTIONS:    Q:  How should my incision look?    A:  Normally your incision will appear slightly swollen with light redness directly along the incision itself as it heals.  It may feel like a bump or ridge as the healing/scarring happens, and over time (3-4 months) this bump or ridge feeling should slowly go away.  In general, clear or pink watery drainage can be normal at first as your incision heals, but should decrease over time.    Q:  How do I know if my incision is infected?  A:  Look at your incision for signs of infection, like redness around the incision spreading to surrounding skin, or drainage of cloudy or foul-smelling drainage.  If you feel warm, check your temperature to see if you are running a fever.    **If any of these things occur, please notify the nurse at our office.  We may need you to come into the office for an incision check.      Q:  How do I take care of my incision?  A:  If you have a dressing in place - Starting the day after surgery, replace the dressing 1-2 times a day until there is no further drainage from the incision.  At that time, a dressing is no longer needed.  Try to minimize tape on the skin if irritation is occurring at the tape sites.  If you have significant irritation from tape on the skin, please call the office to discuss other method of dressing your incision.    Small pieces of tape called  steri-strips  may be present directly overlying your incision; these may be removed 10 days after surgery unless otherwise specified by your surgeon.  If these tapes start to loosen at the ends, you may trim them back until they fall off or are removed.    A:  If you had  Dermabond  tissue glue used as a dressing (this causes your  incision to look shiny with a clear covering over it) - This type of dressing wears off with time and does not require more dressings over the top unless it is draining around the glue as it wears off.  Do not apply ointments or lotions over the incisions until the glue has completely worn off.    Q:  There is a piece of tape or a sticky  lead  still on my skin.  Can I remove this?  A:  Sometimes the sticky  leads  used for monitoring during surgery or for evaluation in the emergency department are not all removed while you are in the hospital.  These sometimes have a tab or metal dot on them.  You can easily remove these on your own, like taking off a band-aid.  If there is a gel substance under the  lead , simply wipe/clean it off with a washcloth or paper towel.      Q:  What can I do to minimize constipation (very hard stools, or lack of stools)?  A:  Stay well hydrated.  Increase your dietary fiber intake or take a fiber supplement -with plenty of water.  Walk around frequently.  You may consider an over-the-counter stool-softener.  Your Pharmacist can assist you with choosing one that is stocked at your pharmacy.  Constipation is also one of the most common side effects of pain medication.  If you are using pain medication, be pro-active and try to PREVENT problems with constipation by taking the steps above BEFORE constipation becomes a problem.    Q:  What do I do if I need more pain medications?  A:  Call the office to receive refills.  Be aware that certain pain meds cannot be called into a pharmacy and actually require a paper prescription.  A change may be made in your pain med as you progress thru your recovery period or if you have side effects to certain meds.    --Pain meds are NOT refilled after 5pm on weekdays, and NOT AT ALL on the weekends, so please look ahead to prevent problems.      Q:  Why am I having a hard time sleeping now that I am at home?  A:  Many medications you receive while you are  in the hospital can impact your sleep for a number of days after your surgery/hospitalization.  Decreased level of activity and naps during the day may also make sleeping at night difficult.  Try to minimize day-time naps, and get up frequently during the day to walk around your home during your recovery time.  Sleep aides may be of some help, but are not recommended for long-term use.      Q:  I am having some back discomfort.  What should I do?  A:  This may be related to certain positioning that was required for your surgery, extended periods of time in bed, or other changes in your overall activity level.  You may try ice, heat, acetaminophen, or ibuprofen to treat this temporarily.  Note that many pain medications have acetaminophen in them and would state this on the prescription bottle.  Be sure not to exceed the maximum of 4000mg per day of acetaminophen.     **If the pain you are having does not resolve, is severe, or is a flare of back pain you have had on other occasions prior to surgery, please contact your primary physician for further recommendations or for an appointment to be examined at their office.    Q:  Why am I having headaches?  A:  Headaches can be caused by many things:  caffeine withdrawal, use of pain meds, dehydration, high blood pressure, lack of sleep, over-activity/exhaustion, flare-up of usual migraine headaches.  If you feel this is related to muscle tension (a band-like feeling around the head, or a pressure at the low-back of the head) you may try ice or heat to this area.  You may need to drink more fluids (try electrolyte drink like Gatorade), rest, or take your usual migraine medications.   **If your headaches do not resolve, worsen, are accompanied by other symptoms, or if your blood pressure is high, please call your primary physician for recommendation and/or examination.    Q:  I am unable to urinate.  What do I do?  A:  A small percentage of people can have difficulty  urinating initially after surgery.  This includes being able to urinate only a very small amount at a time and feeling discomfort or pressure in the very low abdomen.  This is called  urinary retention , and is actually an urgent situation.  Proceed to your nearest Emergency department for evaluation (not an Urgent Care Center).  Sometimes the bladder does not work correctly after certain medications you receive during surgery, or related to certain procedures.  You may need to have a catheter placed until your bladder recovers.  When planning to go to an Emergency department, it may help to call the ER to let them know you are coming in for this problem after a surgery.  This may help you get in quicker to be evaluated.  **If you have symptoms of a urinary tract infection, please contact your primary physician for the proper evaluation and treatment.    If you have other questions, please call the office Monday thru Friday between 8am and 5pm to discuss with the nurse or physician assistant.  #[phone]    There is a surgeon ON CALL on weekday evenings and over the weekend in case of urgent need only, and may be contacted at the same number.    If you are having an emergency, call 911 or proceed to your nearest emergency department.      GENERAL ANESTHESIA OR SEDATION ADULT DISCHARGE INSTRUCTIONS   SPECIAL PRECAUTIONS FOR 24 HOURS AFTER SURGERY    IT IS NOT UNUSUAL TO FEEL LIGHT-HEADED OR FAINT, UP TO 24 HOURS AFTER SURGERY OR WHILE TAKING PAIN MEDICATION.  IF YOU HAVE THESE SYMPTOMS; SIT FOR A FEW MINUTES BEFORE STANDING AND HAVE SOMEONE ASSIST YOU WHEN YOU GET UP TO WALK OR USE THE BATHROOM.    YOU SHOULD REST AND RELAX FOR THE NEXT 24 HOURS AND YOU MUST MAKE ARRANGEMENTS TO HAVE SOMEONE STAY WITH YOU FOR AT LEAST 24 HOURS AFTER YOUR DISCHARGE.  AVOID HAZARDOUS AND STRENUOUS ACTIVITIES.  DO NOT MAKE IMPORTANT DECISIONS FOR 24 HOURS.    DO NOT DRIVE ANY VEHICLE OR OPERATE MECHANICAL EQUIPMENT FOR 24 HOURS  "FOLLOWING THE END OF YOUR SURGERY.  EVEN THOUGH YOU MAY FEEL NORMAL, YOUR REACTIONS MAY BE AFFECTED BY THE MEDICATION YOU HAVE RECEIVED.    DO NOT DRINK ALCOHOLIC BEVERAGES FOR 24 HOURS FOLLOWING YOUR SURGERY.    DRINK CLEAR LIQUIDS (APPLE JUICE, GINGER ALE, 7-UP, BROTH, ETC.).  PROGRESS TO YOUR REGULAR DIET AS YOU FEEL ABLE.    YOU MAY HAVE A DRY MOUTH, A SORE THROAT, MUSCLES ACHES OR TROUBLE SLEEPING.  THESE SHOULD GO AWAY AFTER 24 HOURS.    CALL YOUR DOCTOR FOR ANY OF THE FOLLOWING:  SIGNS OF INFECTION (FEVER, GROWING TENDERNESS AT THE SURGERY SITE, A LARGE AMOUNT OF DRAINAGE OR BLEEDING, SEVERE PAIN, FOUL-SMELLING DRAINAGE, REDNESS OR SWELLING.    IT HAS BEEN OVER 8 TO 10 HOURS SINCE SURGERY AND YOU ARE STILL NOT ABLE TO URINATE (PASS WATER).       You received Toradol, an IV form of ibuprofen (Motrin) at 4:20 pm.  Do not take any ibuprofen products until 10:20 pm.  You received one oxyCODONE (ROXICODONE) tablet 5 mg at 4:20 pm.      Pending Results     Date and Time Order Name Status Description    11/27/2018 1418 Surgical pathology exam In process     11/25/2018 0832 Troponin I In process     11/25/2018 0832 Lipase In process     11/25/2018 0832 Comprehensive metabolic panel In process     11/25/2018 0827 CBC with platelets differential In process             Admission Information     Date & Time Provider Department Dept. Phone    11/27/2018 Prema Salmeron MD [facility] PreOP/PostOP [phone]      Your Vitals Were     Blood Pressure Temperature Respirations Height Weight Last Period    134/67 98  F (36.7  C) (Temporal) 18 1.651 m (5' 5\") 108 kg (238 lb) 11/01/2018    Pulse Oximetry BMI (Body Mass Index)                98% 39.61 kg/m2          MyChart Information     [employer] gives you secure access to your electronic health record. If you see a primary care provider, you can also send messages to your care team and make appointments. If you have questions, please call your primary care clinic.  " If you do not have a primary care provider, please call [phone] and they will assist you.        Care EveryWhere ID     This is your Care EveryWhere ID. This could be used by other organizations to access your [place] medical records  [id number]        Equal Access to Services     KADYADEN EVERARDO : Hadii aad ku hadjulianeugenia Anelali, waheenada luqadaha, qaybta kaalmada denaeginger, madison mckeon camilozohra banks. So [facility] [phone].    ATENCIÓN: Si habla español, tiene a bedoya disposición servicios gratuitos de asistencia lingüística. Llame al [phone].    We comply with applicable federal civil rights laws and [place] laws. We do not discriminate on the basis of race, color, national origin, age, disability, sex, sexual orientation, or gender identity.               Review of your medicines      START taking        Dose / Directions    ibuprofen 600 MG tablet   Commonly known as:  ADVIL/MOTRIN   Used for:  Gallstones        Dose:  600 mg   Take 1 tablet (600 mg) by mouth every 6 hours as needed for moderate pain   Quantity:  30 tablet   Refills:  1       senna-docusate 8.6-50 MG tablet   Commonly known as:  SENOKOT-S/PERICOLACE   Used for:  Gallstones        Dose:  1-2 tablet   Take 1-2 tablets by mouth 2 times daily as needed for constipation (While on oral opioids.)   Quantity:  30 tablet   Refills:  0         CONTINUE these medicines which may have CHANGED, or have new prescriptions. If we are uncertain of the size of tablets/capsules you have at home, strength may be listed as something that might have changed.        Dose / Directions    HYDROmorphone 2 MG tablet   Commonly known as:  DILAUDID   This may have changed:    - how much to take  - when to take this  - reasons to take this   Used for:  Gallstones        Dose:  2-4 mg   Take 1-2 tablets (2-4 mg) by mouth every 4 hours as needed for moderate to severe pain   Quantity:  8 tablet   Refills:  0       ondansetron 4 MG ODT tab   Commonly known  as:  ZOFRAN-ODT   This may have changed:  how much to take   Used for:  Gallstones        Dose:  4-8 mg   Take 1-2 tablets (4-8 mg) by mouth every 8 hours as needed for nausea   Quantity:  4 tablet   Refills:  0         CONTINUE these medicines which have NOT CHANGED        Dose / Directions    pediatric multivitamin w/iron 27-0.8 MG tablet        Dose:  1 tablet   Take 1 tablet by mouth daily   Refills:  0       TYLENOL PO        Dose:  500-1000 mg   Take 500-1,000 mg by mouth every 6 hours as needed for mild pain or fever   Refills:  0            Where to get your medicines      These medications were sent to [place], [place] - [address] [address] [facility]  [address] [facility] [address]     Phone:  [phone]     ondansetron 4 MG ODT tab    senna-docusate 8.6-50 MG tablet         Some of these will need a paper prescription and others can be bought over the counter. Ask your nurse if you have questions.     Bring a paper prescription for each of these medications     HYDROmorphone 2 MG tablet       You don't need a prescription for these medications     ibuprofen 600 MG tablet                Protect others around you: Learn how to safely use, store and throw away your medicines at www.disposemymeds.org.        Information about OPIOIDS     PRESCRIPTION OPIOIDS: WHAT YOU NEED TO KNOW   We gave you an opioid (narcotic) pain medicine. It is important to manage your pain, but opioids are not always the best choice. You should first try all the other options your care team gave you. Take this medicine for as short a time (and as few doses) as possible.    Some activities can increase your pain, such as bandage changes or therapy sessions. It may help to take your pain medicine 30 to 60 minutes before these activities. Reduce your stress by getting enough sleep, working on hobbies you enjoy and practicing relaxation or meditation. Talk to your care team about ways to manage your  pain beyond prescription opioids.    These medicines have risks:    DO NOT drive when on new or higher doses of pain medicine. These medicines can affect your alertness and reaction times, and you could be arrested for driving under the influence (DUI). If you need to use opioids long-term, talk to your care team about driving.    DO NOT operate heavy machinery    DO NOT do any other dangerous activities while taking these medicines.    DO NOT drink any alcohol while taking these medicines.     If the opioid prescribed includes acetaminophen, DO NOT take with any other medicines that contain acetaminophen. Read all labels carefully. Look for the word  acetaminophen  or  Tylenol.  Ask your pharmacist if you have questions or are unsure.    You can get addicted to pain medicines, especially if you have a history of addiction (chemical, alcohol or substance dependence). Talk to your care team about ways to reduce this risk.    All opioids tend to cause constipation. Drink plenty of water and eat foods that have a lot of fiber, such as fruits, vegetables, prune juice, apple juice and high-fiber cereal. Take a laxative (Miralax, milk of magnesia, Colace, Senna) if you don t move your bowels at least every other day. Other side effects include upset stomach, sleepiness, dizziness, throwing up, tolerance (needing more of the medicine to have the same effect), physical dependence and slowed breathing.    Store your pills in a secure place, locked if possible. We will not replace any lost or stolen medicine. If you don t finish your medicine, please throw away (dispose) as directed by your pharmacist. The [place] [employer]ution [employer] has more information about safe disposal: https://www.pca.[facility].[place].us/living-green/managing-unwanted-medications             Medication List: This is a list of all your medications and when to take them. Check marks below indicate your daily home schedule. Keep this list as a reference.       Medications           Morning Afternoon Evening Bedtime As Needed    HYDROmorphone 2 MG tablet   Commonly known as:  DILAUDID   Take 1-2 tablets (2-4 mg) by mouth every 4 hours as needed for moderate to severe pain                                ibuprofen 600 MG tablet   Commonly known as:  ADVIL/MOTRIN   Take 1 tablet (600 mg) by mouth every 6 hours as needed for moderate pain                                ondansetron 4 MG ODT tab   Commonly known as:  ZOFRAN-ODT   Take 1-2 tablets (4-8 mg) by mouth every 8 hours as needed for nausea                                pediatric multivitamin w/iron 27-0.8 MG tablet   Take 1 tablet by mouth daily                                senna-docusate 8.6-50 MG tablet   Commonly known as:  SENOKOT-S/PERICOLACE   Take 1-2 tablets by mouth 2 times daily as needed for constipation (While on oral opioids.)                                TYLENOL PO   Take 500-1,000 mg by mouth every 6 hours as needed for mild pain or fever

## 2018-11-27 NOTE — IP AVS SNAPSHOT
[facility] PreOP/PostOP    [address]    [facility] [address]    Phone:  [phone]    Fax:  [phone]                                       After Visit Summary   11/27/2018    Neetu Bella    MRN: [id number]           After Visit Summary Signature Page     I have received my discharge instructions, and my questions have been answered. I have discussed any challenges I see with this plan with the nurse or doctor.    ..........................................................................................................................................  Patient/Patient Representative Signature      ..........................................................................................................................................  Patient Representative Print Name and Relationship to Patient    ..................................................               ................................................  Date                                   Time    ..........................................................................................................................................  Reviewed by Signature/Title    ...................................................              ..............................................  Date                                               Time          22EPIC Rev 08/18

## 2018-11-27 NOTE — DISCHARGE INSTRUCTIONS
HOME CARE FOLLOWING LAPAROSCOPIC CHOLECYSTECTOMY  SARA Davila, ASHLEE Perez R. O Donnell, J. Shaheen    INCISIONAL CARE:  Replace the bandage over your incisions until all drainage stops, or if more comfortable to have in place.  If present, leave the steri-strips (white paper tapes) in place for 14 days after surgery.  If Dermabond (a type of skin glue) is present, leave in place until it wears/flakes off.     BATHING:  Avoid baths for 1 week after surgery.  Showers are okay.  You may wash your hair at any time.  Gently pat your incisions dry after bathing.    ACTIVITY:  Light Activity -- you may immediately be up and about as tolerated.  Driving -- you may drive when comfortable and off narcotic pain medications.  Light Work -- resume when comfortable off pain medications.  (If you can drive, you probably can work.)  Strenuous Work/Activity -- limit lifting to 20 pounds for 2 weeks.  Progressively increase with time.  Active Sports (running, biking, etc.) -- cautiously resume after 2 weeks.    DISCOMFORT:  Use pain medications as prescribed by your surgeon.  Take the pain medication with some food, when possible, to minimize side effects.  Intermittent use of ice packs at the incision sites may help during the first 48 hours.  Expect gradual improvement.  You may experience shoulder pain, which is due to the air placed within your abdomen during the procedure.  This is temporary and usually passes within 2 days.    DIET:  Drink plenty of fluids.  While taking pain medications, increase dietary fiber or add a fiber supplementation like Metamucil or Citrucel to help prevent constipation - a possible side effect of pain medications.  It is not uncommon to experience some bowel changes (loose stools or constipation) after surgery.  Your body has to adapt to you no longer having a gall bladder.  To help minimize this side effect, avoid fatty foods for the first week after surgery.  You may  then slowly increase the amount of fatty foods in your diet.      NAUSEA:  If nauseated from the anesthetic/pain meds; rest in bed, get up cautiously with assistance, and drink clear liquids (juice, tea, broth).    RETURN APPOINTMENT:  Schedule a follow-up visit 2-3 weeks post-op.  Office Phone:  [phone]     CONTACT US IF THE FOLLOWING DEVELOPS:   1. A fever that is above 101     2. If there is a large amount of drainage, bleeding, or swelling.   3. Severe pain that is not relieved by your prescription.   4. Drainage that is thick, cloudy, yellow, green or white.   5. Any other questions not answered by  Frequently Asked Questions  sheet.      FREQUENTLY ASKED QUESTIONS:    Q:  How should my incision look?    A:  Normally your incision will appear slightly swollen with light redness directly along the incision itself as it heals.  It may feel like a bump or ridge as the healing/scarring happens, and over time (3-4 months) this bump or ridge feeling should slowly go away.  In general, clear or pink watery drainage can be normal at first as your incision heals, but should decrease over time.    Q:  How do I know if my incision is infected?  A:  Look at your incision for signs of infection, like redness around the incision spreading to surrounding skin, or drainage of cloudy or foul-smelling drainage.  If you feel warm, check your temperature to see if you are running a fever.    **If any of these things occur, please notify the nurse at our office.  We may need you to come into the office for an incision check.      Q:  How do I take care of my incision?  A:  If you have a dressing in place - Starting the day after surgery, replace the dressing 1-2 times a day until there is no further drainage from the incision.  At that time, a dressing is no longer needed.  Try to minimize tape on the skin if irritation is occurring at the tape sites.  If you have significant irritation from tape on the skin, please call the  office to discuss other method of dressing your incision.    Small pieces of tape called  steri-strips  may be present directly overlying your incision; these may be removed 10 days after surgery unless otherwise specified by your surgeon.  If these tapes start to loosen at the ends, you may trim them back until they fall off or are removed.    A:  If you had  Dermabond  tissue glue used as a dressing (this causes your incision to look shiny with a clear covering over it) - This type of dressing wears off with time and does not require more dressings over the top unless it is draining around the glue as it wears off.  Do not apply ointments or lotions over the incisions until the glue has completely worn off.    Q:  There is a piece of tape or a sticky  lead  still on my skin.  Can I remove this?  A:  Sometimes the sticky  leads  used for monitoring during surgery or for evaluation in the emergency department are not all removed while you are in the hospital.  These sometimes have a tab or metal dot on them.  You can easily remove these on your own, like taking off a band-aid.  If there is a gel substance under the  lead , simply wipe/clean it off with a washcloth or paper towel.      Q:  What can I do to minimize constipation (very hard stools, or lack of stools)?  A:  Stay well hydrated.  Increase your dietary fiber intake or take a fiber supplement -with plenty of water.  Walk around frequently.  You may consider an over-the-counter stool-softener.  Your Pharmacist can assist you with choosing one that is stocked at your pharmacy.  Constipation is also one of the most common side effects of pain medication.  If you are using pain medication, be pro-active and try to PREVENT problems with constipation by taking the steps above BEFORE constipation becomes a problem.    Q:  What do I do if I need more pain medications?  A:  Call the office to receive refills.  Be aware that certain pain meds cannot be called into a  pharmacy and actually require a paper prescription.  A change may be made in your pain med as you progress thru your recovery period or if you have side effects to certain meds.    --Pain meds are NOT refilled after 5pm on weekdays, and NOT AT ALL on the weekends, so please look ahead to prevent problems.      Q:  Why am I having a hard time sleeping now that I am at home?  A:  Many medications you receive while you are in the hospital can impact your sleep for a number of days after your surgery/hospitalization.  Decreased level of activity and naps during the day may also make sleeping at night difficult.  Try to minimize day-time naps, and get up frequently during the day to walk around your home during your recovery time.  Sleep aides may be of some help, but are not recommended for long-term use.      Q:  I am having some back discomfort.  What should I do?  A:  This may be related to certain positioning that was required for your surgery, extended periods of time in bed, or other changes in your overall activity level.  You may try ice, heat, acetaminophen, or ibuprofen to treat this temporarily.  Note that many pain medications have acetaminophen in them and would state this on the prescription bottle.  Be sure not to exceed the maximum of 4000mg per day of acetaminophen.     **If the pain you are having does not resolve, is severe, or is a flare of back pain you have had on other occasions prior to surgery, please contact your primary physician for further recommendations or for an appointment to be examined at their office.    Q:  Why am I having headaches?  A:  Headaches can be caused by many things:  caffeine withdrawal, use of pain meds, dehydration, high blood pressure, lack of sleep, over-activity/exhaustion, flare-up of usual migraine headaches.  If you feel this is related to muscle tension (a band-like feeling around the head, or a pressure at the low-back of the head) you may try ice or heat to  this area.  You may need to drink more fluids (try electrolyte drink like Gatorade), rest, or take your usual migraine medications.   **If your headaches do not resolve, worsen, are accompanied by other symptoms, or if your blood pressure is high, please call your primary physician for recommendation and/or examination.    Q:  I am unable to urinate.  What do I do?  A:  A small percentage of people can have difficulty urinating initially after surgery.  This includes being able to urinate only a very small amount at a time and feeling discomfort or pressure in the very low abdomen.  This is called  urinary retention , and is actually an urgent situation.  Proceed to your nearest Emergency department for evaluation (not an Urgent Care Center).  Sometimes the bladder does not work correctly after certain medications you receive during surgery, or related to certain procedures.  You may need to have a catheter placed until your bladder recovers.  When planning to go to an Emergency department, it may help to call the ER to let them know you are coming in for this problem after a surgery.  This may help you get in quicker to be evaluated.  **If you have symptoms of a urinary tract infection, please contact your primary physician for the proper evaluation and treatment.    If you have other questions, please call the office Monday thru Friday between 8am and 5pm to discuss with the nurse or physician assistant.  #[phone]    There is a surgeon ON CALL on weekday evenings and over the weekend in case of urgent need only, and may be contacted at the same number.    If you are having an emergency, call 911 or proceed to your nearest emergency department.      GENERAL ANESTHESIA OR SEDATION ADULT DISCHARGE INSTRUCTIONS   SPECIAL PRECAUTIONS FOR 24 HOURS AFTER SURGERY    IT IS NOT UNUSUAL TO FEEL LIGHT-HEADED OR FAINT, UP TO 24 HOURS AFTER SURGERY OR WHILE TAKING PAIN MEDICATION.  IF YOU HAVE THESE SYMPTOMS; SIT FOR A  FEW MINUTES BEFORE STANDING AND HAVE SOMEONE ASSIST YOU WHEN YOU GET UP TO WALK OR USE THE BATHROOM.    YOU SHOULD REST AND RELAX FOR THE NEXT 24 HOURS AND YOU MUST MAKE ARRANGEMENTS TO HAVE SOMEONE STAY WITH YOU FOR AT LEAST 24 HOURS AFTER YOUR DISCHARGE.  AVOID HAZARDOUS AND STRENUOUS ACTIVITIES.  DO NOT MAKE IMPORTANT DECISIONS FOR 24 HOURS.    DO NOT DRIVE ANY VEHICLE OR OPERATE MECHANICAL EQUIPMENT FOR 24 HOURS FOLLOWING THE END OF YOUR SURGERY.  EVEN THOUGH YOU MAY FEEL NORMAL, YOUR REACTIONS MAY BE AFFECTED BY THE MEDICATION YOU HAVE RECEIVED.    DO NOT DRINK ALCOHOLIC BEVERAGES FOR 24 HOURS FOLLOWING YOUR SURGERY.    DRINK CLEAR LIQUIDS (APPLE JUICE, GINGER ALE, 7-UP, BROTH, ETC.).  PROGRESS TO YOUR REGULAR DIET AS YOU FEEL ABLE.    YOU MAY HAVE A DRY MOUTH, A SORE THROAT, MUSCLES ACHES OR TROUBLE SLEEPING.  THESE SHOULD GO AWAY AFTER 24 HOURS.    CALL YOUR DOCTOR FOR ANY OF THE FOLLOWING:  SIGNS OF INFECTION (FEVER, GROWING TENDERNESS AT THE SURGERY SITE, A LARGE AMOUNT OF DRAINAGE OR BLEEDING, SEVERE PAIN, FOUL-SMELLING DRAINAGE, REDNESS OR SWELLING.    IT HAS BEEN OVER 8 TO 10 HOURS SINCE SURGERY AND YOU ARE STILL NOT ABLE TO URINATE (PASS WATER).       You received Toradol, an IV form of ibuprofen (Motrin) at 4:20 pm.  Do not take any ibuprofen products until 10:20 pm.  You received one oxyCODONE (ROXICODONE) tablet 5 mg at 4:20 pm.

## 2018-11-27 NOTE — ANESTHESIA POSTPROCEDURE EVALUATION
Patient: Neetu Bella    Procedure(s):  LAPAROSCOPIC CHOLECYSTECTOMY WITH CHOLANGIOGRAMS    Diagnosis:Biliary colic  Diagnosis Additional Information: Pre-operative diagnosis: symptomatic cholelithiasis  Post-operative diagnosis: same   Procedure: laparoscopic cholecystectomy  intraoperative cholangiogram          Anesthesia Type:  General    Note:  Anesthesia Post Evaluation    Patient location during evaluation: PACU  Patient participation: Able to fully participate in evaluation  Level of consciousness: awake  Pain management: adequate  Airway patency: patent  Cardiovascular status: acceptable  Respiratory status: acceptable  Hydration status: acceptable  PONV: controlled     Anesthetic complications: None          Last vitals:  Vitals:    11/27/18 1149 11/27/18 1444   BP: 143/79    Resp: 16 12   Temp: 98  F (36.7  C)    SpO2: 97%          Electronically Signed By: Gilberto Rubalcava MD  November 27, 2018  3:17 PM

## 2018-11-27 NOTE — OP NOTE
General Surgery Operative Note      Pre-operative diagnosis: symptomatic cholelithiasis   Post-operative diagnosis: same    Procedure: laparoscopic cholecystectomy  intraoperative cholangiogram     Surgeon: Prema Salmeron MD   Assistant(s): Salvatore Bailon PA-C  The Physician Assistant was medically necessary for their expertise in prepping, camera management, suctioning, suturing and retraction.   Anesthesia: general    Estimated blood loss:   5 cc     Specimens: gallbladder and contents     DESCRIPTION OF PROCEDURE: The patient was taken to the operating room and placed on the table in supine position. General endotracheal anesthesia was induced and the abdomen was prepped and draped in standard sterile fashion. An incision was made just above the umbilicus with a blade. The incision was carried down to the fascia. The fascia was incised in the midline with a blade. The peritoneum was entered bluntly with a Carmalt clamp. Two interrupted 0 Vicryl sutures were placed at the extremes of this fascial incision. The Flora trocar was introduced and the abdomen was insufflated with CO2. A 5 mm trocar was placed in the subxiphoid position. A 5 mm trocar was placed in the right upper quadrant, just below the costal margin at the midclavicular line. Another was placed at the anterior axillary line just below the costal margin on the right. The patient was placed in reverse Trendelenburg and right side up. The gallbladder appeared normal. The fundus of the gallbladder was grasped and retracted cephalad. The infundibulum was grasped and retracted laterally. The peritoneum over the medial and lateral aspects of the triangle of Calot was taken down with the [place] dissector.  The triangle of Calot was skeletonized, thus obtaining the critical view of safety.  Several small stones were milked back into the gallbladder from the cystic duct and the infundibulum of the gallbladder was then grasped with the Palomo clamp.  Thereby the cholangiogram catheter was introduced and used to canulate the infundibulum of the gallbladder. The cholangiogram revealed a biliary tree without filling defects. The radiologist confirmed these findings. The cholangiogram catheter was removed from the abdomen. The duct was thrice clipped and then transected, leaving two clips on the cystic duct stump. The cystic artery was freed up from surrounding tissues. It was clipped twice proximally, once distally and transected with the hook scissors. A thorough evaluation of the triangle of Calot revealed no aberrant ducts or vessels. The gallbladder was then removed from the liver using the hook electrocautery. The gallbladder was passed into an Endocatch bag and removed through the umbilical trocar site. We observed the right upper quadrant carefully for hemostasis. Hemostasis was assured. We irrigated with copious amounts of sterile saline and aspirated the effluent. The right upper quadrant trocar sites were anesthetized with local anesthetic. Each of the trocars was removed under direct visualization. There was no bleeding from any of these sites.  The Flora trocar was removed and the abdomen was evacuated of CO2.  One additional interrupted 0 Vicryl suture was placed in the umbilical trocar site fascia.  Each of the three 0 Vicryl sutures was cinched down and tied. The skin of the umbilical incision was anesthetized with local anesthetic.  All of the incisions were closed with interrupted 4-0 Vicryl subcuticular sutures and Steri-Strips.  The patient tolerated the procedure well.  Sponge and instrument counts were correct.   Prema Salmeron MD

## 2018-11-28 LAB — COPATH REPORT: NORMAL

## 2018-12-04 ENCOUNTER — OFFICE VISIT (OUTPATIENT)
Dept: SURGERY | Facility: CLINIC | Age: 33
End: 2018-12-04
Payer: COMMERCIAL

## 2018-12-04 VITALS
SYSTOLIC BLOOD PRESSURE: 110 MMHG | HEIGHT: 65 IN | DIASTOLIC BLOOD PRESSURE: 64 MMHG | RESPIRATION RATE: 16 BRPM | WEIGHT: 238 LBS | BODY MASS INDEX: 39.65 KG/M2 | HEART RATE: 85 BPM | OXYGEN SATURATION: 98 %

## 2018-12-04 DIAGNOSIS — Z09 SURGICAL FOLLOWUP VISIT: Primary | ICD-10-CM

## 2018-12-04 PROCEDURE — 99024 POSTOP FOLLOW-UP VISIT: CPT | Performed by: PHYSICIAN ASSISTANT

## 2018-12-04 NOTE — PROGRESS NOTES
12/4/2018    [facility] Note     Subjective:  Neetu Bella is here for her first postoperative visit. She underwent a laparoscopic cholecystectomy by Dr. Salmeron on 11/27/2018. Today she  tells me she has been feeling well since surgery. She currently does not require narcotic pain medications, she is eating a normal diet and her bowels are regular. Her only complaint today is irritation at her epigastric incision, causing her to avoid wearing a bra.  She has no other concerns.    Objective:  Abd - Abdomen soft, non-tender.   Inc - Healing well, well approximated and without signs of infection    Assessment:  S/p laparoscopic cholecystectomy. The pathology confirms chronic cholecystitis and cholelithiasis.    Plan:  RTC PRN      Terri Valerio PA-C      Please route or send letter to:  Primary Care Provider (PCP)

## 2018-12-04 NOTE — MR AVS SNAPSHOT
"              After Visit Summary   12/4/2018    Neetu Bella    MRN: [id number]           Patient Information     Date Of Birth          1985        Visit Information        Provider Department      12/4/2018 11:00 AM Terri Valerio PA-C [facility] General Surgery      Today's Diagnoses     Surgical followup visit    -  1       Follow-ups after your visit        Follow-up notes from your care team     Return if symptoms worsen or fail to improve.      Who to contact     If you have questions or need follow up information about today's clinic visit or your schedule please contact [facility] directly at [phone].  Normal or non-critical lab and imaging results will be communicated to you by MyChart, letter or phone within 4 business days after the clinic has received the results. If you do not hear from us within 7 days, please contact the clinic through "[employer]hart or phone. If you have a critical or abnormal lab result, we will notify you by phone as soon as possible.  Submit refill requests through [facility] or call your pharmacy and they will forward the refill request to us. Please allow 3 business days for your refill to be completed.          Additional Information About Your Visit        MyChart Information     [facility] gives you secure access to your electronic health record. If you see a primary care provider, you can also send messages to your care team and make appointments. If you have questions, please call your primary care clinic.  If you do not have a primary care provider, please call [phone] and they will assist you.        Care EveryWhere ID     This is your Care EveryWhere ID. This could be used by other organizations to access your [facility] medical records  [id number]        Your Vitals Were     Pulse Respirations Height Pulse Oximetry Breastfeeding? BMI (Body Mass Index)    85 16 1.651 m (5' 5\") " 98% No 39.61 kg/m2       Blood Pressure from Last 3 Encounters:   12/04/18 110/64   11/27/18 128/55   11/26/18 104/72    Weight from Last 3 Encounters:   12/04/18 108 kg (238 lb)   11/27/18 108 kg (238 lb)   11/26/18 108 kg (238 lb)              Today, you had the following     No orders found for display       Primary Care Provider Fax #    Physician No Ref-Primary [phone]       No address on file        Equal Access to Services     PALOMO MCGEE : Hadii darrell navarroo Soomaali, waaxda luqadaha, qaybta kaalmada adeegyada, madison galicia . So [facility] [phone].    ATENCIÓN: Si habla español, tiene a bedoya disposición servicios gratuitos de asistencia lingüística. Llame al [phone].    We comply with applicable federal civil rights laws and [place] laws. We do not discriminate on the basis of race, color, national origin, age, disability, sex, sexual orientation, or gender identity.            Thank you!     Thank you for choosing [facility]  for your care. Our goal is always to provide you with excellent care. Hearing back from our patients is one way we can continue to improve our services. Please take a few minutes to complete the written survey that you may receive in the mail after your visit with us. Thank you!             Your Updated Medication List - Protect others around you: Learn how to safely use, store and throw away your medicines at www.disposemymeds.org.          This list is accurate as of 12/4/18 11:25 AM.  Always use your most recent med list.                   Brand Name Dispense Instructions for use Diagnosis    ibuprofen 600 MG tablet    ADVIL/MOTRIN    30 tablet    Take 1 tablet (600 mg) by mouth every 6 hours as needed for moderate pain    Gallstones       prenatal multivitamin w/iron 27-0.8 MG tablet      Take 1 tablet by mouth daily        TYLENOL PO      Take 500-1,000 mg by mouth every 6 hours as needed for mild pain or fever

## 2018-12-04 NOTE — LETTER
2018    RE: Neetu Bella  :  1985      This is to certify that Neetu Bella has been under my care for surgery.      Patient is able to return to work without restrictions as of 12/10/2018.      Sincerely,            Terri Valerio PA-C

## 2019-11-13 NOTE — TELEPHONE ENCOUNTER
FUTURE VISIT INFORMATION      FUTURE VISIT INFORMATION:    Date: 11/26/19    Time: 8:30AM Audio / 9AM ENT    Location: [facility] [place]  REFERRAL INFORMATION:    Referring provider:      Referring providers clinic:      Reason for visit/diagnosis : Diagnosed with double ear infection/sinus infection [place] - difficulty hearing out of left ear and a little difficulty hearing out of right ear    RECORDS REQUESTED FROM:       Clinic name Comments Records Status Imaging Status   [facility]  10/31/2019 notes with Bena Yepez MD   Care Everywhere

## 2019-11-26 ENCOUNTER — OFFICE VISIT (OUTPATIENT)
Dept: AUDIOLOGY | Facility: CLINIC | Age: 34
End: 2019-11-26
Payer: COMMERCIAL

## 2019-11-26 ENCOUNTER — OFFICE VISIT (OUTPATIENT)
Dept: OTOLARYNGOLOGY | Facility: CLINIC | Age: 34
End: 2019-11-26
Payer: COMMERCIAL

## 2019-11-26 ENCOUNTER — PRE VISIT (OUTPATIENT)
Dept: OTOLARYNGOLOGY | Facility: CLINIC | Age: 34
End: 2019-11-26

## 2019-11-26 DIAGNOSIS — H92.02 LEFT EAR PAIN: ICD-10-CM

## 2019-11-26 DIAGNOSIS — H92.02 OTALGIA OF LEFT EAR: Primary | ICD-10-CM

## 2019-11-26 DIAGNOSIS — M26.609 TMJ (TEMPOROMANDIBULAR JOINT SYNDROME): Primary | ICD-10-CM

## 2019-11-26 RX ORDER — SERTRALINE HYDROCHLORIDE 25 MG/1
25 TABLET, FILM COATED ORAL DAILY
COMMUNITY
Start: 2019-11-26

## 2019-11-26 NOTE — LETTER
11/26/2019       RE: Neetu Bella  [address] [place] [address]     Dear Colleague,    Thank you for referring your patient, Neetu Bella, to the [facility] at [facility]. Please see a copy of my visit note below.    [facility] New Patient Visit Note        Otolaryngology    Referring Provider:  Bean Yepez MD    HPI:  Neetu Bella is a 34 year old female who presents for evaluation of some left ear discomfort.    In mid [place], [place].  When she was on the flight back, she noticed that her left ear was very plugged.  She also then got symptoms of bilateral ear pain, infections and sinus infection.  She was treated on October 31, 2018 with Augmentin.  She also was using Flonase.  The Augmentin made her sinuses better but did not seem to help her ears.  The Flonase she could not use because she broke out in a rash around her nose and upper lip.  She did use Sudafed instead and that seemed to help with the ear pressure and plugging.  However, it still present.    Now, her only complaint is that she continues to have some left ear plugging and a sense of muffled hearing.  Sometimes when she swallows or yawns, the popping or crackling in her left ear.  She is not having any drainage.  She does get some pain from time to time.  She also has some jaw pain and clicking.  She knows she  clenches her teeth.  She has never been treated for this.    A few weeks back, when her ear pain in the left ear got worse, she went in and was given a Medrol dose pack and ofloxacin drops.  That helped.  Those symptoms have gone away.  She does intermittently get some dizziness or lightheadedness but no vertigo. No trouble swallowing or eating.  No facial numbness or tingling.  No lymphadenopathy in the neck.    She is a non-smoker and only drinks socially.    ROS:  10 point  review of systems completed and is negative except for those listed above    PMH:   Past Medical History:   Diagnosis Date     Complication of anesthesia     Takes a long time to wake up.     Depression      PSH:   Past Surgical History:   Procedure Laterality Date     LAPAROSCOPIC CHOLECYSTECTOMY WITH CHOLANGIOGRAMS N/A 11/27/2018    Procedure: LAPAROSCOPIC CHOLECYSTECTOMY WITH CHOLANGIOGRAMS;  Surgeon: Prema Salmeron MD;  Location: [facility] OR     TONSILLECTOMY       wisdom teeth       FamH:   Family History   Problem Relation Age of Onset     Brain Tumor Maternal Aunt      Brain Cancer Maternal Aunt      Skin Cancer Father      Hypertension Father      Hyperlipidemia Father      Substance Abuse Father      Skin Cancer Maternal Grandmother      Hypertension Maternal Grandmother      Skin Cancer Paternal Grandfather      Hypertension Mother      Anesthesia Reaction Mother      Hypertension Sister      Hyperlipidemia Sister      Hyperlipidemia Maternal Grandfather      Hypertension Brother      SocH:   Social History     Tobacco Use     Smoking status: Never Smoker     Smokeless tobacco: Never Used   Substance Use Topics     Alcohol use: No     Alcohol/week: 0.0 standard drinks     Comment: Rare      Drug use: No     Medications:   Current Outpatient Medications   Medication     sertraline (ZOLOFT) 25 MG tablet     Acetaminophen (TYLENOL PO)     ibuprofen (ADVIL/MOTRIN) 600 MG tablet     Prenatal Vit-Fe Fumarate-FA (PRENATAL MULTIVITAMIN PLUS IRON) 27-0.8 MG TABS per tablet     No current facility-administered medications for this visit.      Allergies:     Allergies   Allergen Reactions     Codeine      Strong family hx, advised not to use      Physical Exam:   There were no vitals taken for this visit.    Constitutional: The patient was unaccompanied, well-groomed, and in no acute distress.    Head: Normocephalic and atraumatic.   Eyes: Pupils were equal and reactive.  Extraocular movement intact.    Ears:  Pinnae and tragus non-tender.    Otologic microscopic ear exam:  Right: EAC clear, TM clear.  No evidence of retraction, effusion, perforation or cholesteatoma.  Left: EAC clear, TM clear.  No evidence of retraction, effusion, perforation or cholesteatoma.  Nose: Sinuses were non-tender.  Anterior rhinoscopy revealed midline septum and absence of purulence or polyps.    Mouth: Mucosa pink and moist, tonsils non-erythematous, no exudates, uvula midline  Neck: No lymphadenopathy in the neck.  No palpable thyroid.  Normal range of motion  Respiratory: Breathing comfortably without stridor or exertion of accessory muscles.   Skin: Normal:  warm and pink without rash  Neurologic: Alert and oriented x 3.  CN's III-XII within normal limits.  Voice normal.   Psychiatric: The patient's affect was calm, cooperative, and appropriate.    Communication: Normal; communicates verbally, normal voice quality.    Audiogram November 26, 2019: Normal hearing sensitivity bilaterally. Normal eardrum mobility right, restricted left. Present ipsi reflexes and elevated contra  reflexes.      Assessment/Plan:   1.  Left ear pain and TMJ syndrome.  Patient with symptoms that are most consistent with inflammatory response to TMJ syndrome causing pain and sometimes causing some eustachian tube dysfunction.  No evidence of significant effusion behind her ear.  No significant changes in her hearing or trouble found on her audiogram.  There is some asymmetry, 15 dB, the thousand hertz, which I asked her to follow-up with a repeat audiogram in 1 year.    At this time, I will have her see the TMJ clinic for treatment.  She is to return if there is no improvement in her symptoms in 2 to 3 months, after therapy.  She can return sooner if there is worsening symptoms.  Patient verbalized understanding is comfortable with plan.    Maryann Cormier PA-C  Otolaryngology  Head & Neck Surgery  [phone]    CC:  Bean Yepez MD  [facility]

## 2019-11-26 NOTE — PROGRESS NOTES
[facility] New Patient Visit Note        Otolaryngology        November 26, 2019    Referring Provider:  Bean Yepez MD        HPI:  Neetu Bella is a 34 year old female who presents for evaluation of some left ear discomfort.    In mid October, the patient traveled [place].  When she was on the flight back, she noticed that her left ear was very plugged.  She also then got symptoms of bilateral ear pain, infections and sinus infection.  She was treated on October 31, 2018 with Augmentin.  She also was using Flonase.  The Augmentin made her sinuses better but did not seem to help her ears.  The Flonase she could not use because she broke out in a rash around her nose and upper lip.  She did use Sudafed instead and that seemed to help with the ear pressure and plugging.  However, it still present.    Now, her only complaint is that she continues to have some left ear plugging and a sense of muffled hearing.  Sometimes when she swallows or yawns, the popping or crackling in her left ear.  She is not having any drainage.  She does get some pain from time to time.  She also has some jaw pain and clicking.  She knows she  clenches her teeth.  She has never been treated for this.    A few weeks back, when her ear pain in the left ear got worse, she went in and was given a Medrol dose pack and ofloxacin drops.  That helped.  Those symptoms have gone away.  She does intermittently get some dizziness or lightheadedness but no vertigo. No trouble swallowing or eating.  No facial numbness or tingling.  No lymphadenopathy in the neck.    She is a non-smoker and only drinks socially.    ROS:  10 point review of systems completed and is negative except for those listed above    PMH:   Past Medical History:   Diagnosis Date     Complication of anesthesia     Takes a long time to wake up.     Depression        PSH:   Past Surgical History:   Procedure Laterality Date     LAPAROSCOPIC  CHOLECYSTECTOMY WITH CHOLANGIOGRAMS N/A 11/27/2018    Procedure: LAPAROSCOPIC CHOLECYSTECTOMY WITH CHOLANGIOGRAMS;  Surgeon: Prema Salmeron MD;  Location: [facility] OR     TONSILLECTOMY       wisdom teeth         FamH:   Family History   Problem Relation Age of Onset     Brain Tumor Maternal Aunt      Brain Cancer Maternal Aunt      Skin Cancer Father      Hypertension Father      Hyperlipidemia Father      Substance Abuse Father      Skin Cancer Maternal Grandmother      Hypertension Maternal Grandmother      Skin Cancer Paternal Grandfather      Hypertension Mother      Anesthesia Reaction Mother      Hypertension Sister      Hyperlipidemia Sister      Hyperlipidemia Maternal Grandfather      Hypertension Brother        SocH:   Social History     Tobacco Use     Smoking status: Never Smoker     Smokeless tobacco: Never Used   Substance Use Topics     Alcohol use: No     Alcohol/week: 0.0 standard drinks     Comment: Rare      Drug use: No       Medications:   Current Outpatient Medications   Medication     sertraline (ZOLOFT) 25 MG tablet     Acetaminophen (TYLENOL PO)     ibuprofen (ADVIL/MOTRIN) 600 MG tablet     Prenatal Vit-Fe Fumarate-FA (PRENATAL MULTIVITAMIN PLUS IRON) 27-0.8 MG TABS per tablet     No current facility-administered medications for this visit.        Allergies:     Allergies   Allergen Reactions     Codeine      Strong family hx, advised not to use          Physical Exam:   There were no vitals taken for this visit.    Constitutional: The patient was unaccompanied, well-groomed, and in no acute distress.    Head: Normocephalic and atraumatic.   Eyes: Pupils were equal and reactive.  Extraocular movement intact.    Ears: Pinnae and tragus non-tender.    Otologic microscopic ear exam:  Right: EAC clear, TM clear.  No evidence of retraction, effusion, perforation or cholesteatoma.  Left: EAC clear, TM clear.  No evidence of retraction, effusion, perforation or cholesteatoma.  Nose: Sinuses  were non-tender.  Anterior rhinoscopy revealed midline septum and absence of purulence or polyps.    Mouth: Mucosa pink and moist, tonsils non-erythematous, no exudates, uvula midline  Neck: No lymphadenopathy in the neck.  No palpable thyroid.  Normal range of motion  Respiratory: Breathing comfortably without stridor or exertion of accessory muscles.   Skin: Normal:  warm and pink without rash  Neurologic: Alert and oriented x 3.  CN's III-XII within normal limits.  Voice normal.   Psychiatric: The patient's affect was calm, cooperative, and appropriate.    Communication: Normal; communicates verbally, normal voice quality.        Audiogram November 26, 2019: Normal hearing sensitivity bilaterally. Normal eardrum mobility right, restricted left. Present ipsi reflexes and elevated contra  reflexes.            Assessment/Plan:   1.  Left ear pain and TMJ syndrome.  Patient with symptoms that are most consistent with inflammatory response to TMJ syndrome causing pain and sometimes causing some eustachian tube dysfunction.  No evidence of significant effusion behind her ear.  No significant changes in her hearing or trouble found on her audiogram.  There is some asymmetry, 15 dB, the thousand hertz, which I asked her to follow-up with a repeat audiogram in 1 year.    At this time, I will have her see the TMJ clinic for treatment.  She is to return if there is no improvement in her symptoms in 2 to 3 months, after therapy.  She can return sooner if there is worsening symptoms.  Patient verbalized understanding is comfortable with plan.        Maryann Cormier PA-C  Otolaryngology  Head & Neck Surgery  [phone]      CC:  Bean Yepez MD  [facility]

## 2019-11-26 NOTE — PROGRESS NOTES
AUDIOLOGY REPORT    SUMMARY: Audiology visit completed. See audiogram for results.      RECOMMENDATIONS: Follow-up with ENT.    Darlene Claire, [facility]  Licensed Audiologist  [place] License #[id number]

## 2019-11-26 NOTE — PATIENT INSTRUCTIONS
Neetu Bella,    It was a pleasure to see you today.    1. You were seen in the ENT Clinic today by Maryann Cormier PA-C    If you have any questions or concerns after your appointment, please call   - Option 1: ENT Clinic: [phone]    2. Recheck hearing in 1 year to make sure there is no change.    3.  I think some of the ear pain and muffled hearing is related to TMJ syndrome.    Please schedule with the TMJ clinic.    4.  OK to use Sudafed for 1 more week, then stop.    5. Return as needed.      Thank you,  Maryann Cormier PA-C  Otolaryngology  Head & Neck Surgery  [phone]

## 2019-12-09 ENCOUNTER — HOSPITAL ENCOUNTER (EMERGENCY)
Facility: CLINIC | Age: 34
Discharge: HOME | End: 2019-12-10
Attending: EMERGENCY MEDICINE | Admitting: EMERGENCY MEDICINE
Payer: COMMERCIAL

## 2019-12-09 VITALS
HEART RATE: 102 BPM | SYSTOLIC BLOOD PRESSURE: 109 MMHG | RESPIRATION RATE: 18 BRPM | DIASTOLIC BLOOD PRESSURE: 67 MMHG | BODY MASS INDEX: 42.92 KG/M2 | WEIGHT: 257.94 LBS | TEMPERATURE: 98.8 F | OXYGEN SATURATION: 94 %

## 2019-12-09 DIAGNOSIS — R50.9 FEVER IN ADULT: ICD-10-CM

## 2019-12-09 DIAGNOSIS — E87.6 HYPOKALEMIA: ICD-10-CM

## 2019-12-09 DIAGNOSIS — R19.7 DIARRHEA, UNSPECIFIED TYPE: ICD-10-CM

## 2019-12-09 LAB
ALBUMIN SERPL-MCNC: 3.7 G/DL (ref 3.4–5)
ALBUMIN UR-MCNC: 30 MG/DL
ALP SERPL-CCNC: 57 U/L (ref 40–150)
ALT SERPL W P-5'-P-CCNC: 27 U/L (ref 0–50)
ANION GAP SERPL CALCULATED.3IONS-SCNC: 9 MMOL/L (ref 3–14)
APPEARANCE UR: ABNORMAL
AST SERPL W P-5'-P-CCNC: 15 U/L (ref 0–45)
BACTERIA #/AREA URNS HPF: ABNORMAL /HPF
BASOPHILS # BLD AUTO: 0 10E9/L (ref 0–0.2)
BASOPHILS NFR BLD AUTO: 0.3 %
BILIRUB SERPL-MCNC: 1 MG/DL (ref 0.2–1.3)
BILIRUB UR QL STRIP: NEGATIVE
BUN SERPL-MCNC: 15 MG/DL (ref 7–30)
CALCIUM SERPL-MCNC: 8.7 MG/DL (ref 8.5–10.1)
CHLORIDE SERPL-SCNC: 105 MMOL/L (ref 94–109)
CO2 SERPL-SCNC: 23 MMOL/L (ref 20–32)
COLOR UR AUTO: YELLOW
CREAT SERPL-MCNC: 0.66 MG/DL (ref 0.52–1.04)
DIFFERENTIAL METHOD BLD: ABNORMAL
EOSINOPHIL # BLD AUTO: 0 10E9/L (ref 0–0.7)
EOSINOPHIL NFR BLD AUTO: 0.1 %
ERYTHROCYTE [DISTWIDTH] IN BLOOD BY AUTOMATED COUNT: 12.2 % (ref 10–15)
GFR SERPL CREATININE-BSD FRML MDRD: >90 ML/MIN/{1.73_M2}
GLUCOSE SERPL-MCNC: 101 MG/DL (ref 70–99)
GLUCOSE UR STRIP-MCNC: NEGATIVE MG/DL
HCT VFR BLD AUTO: 43.6 % (ref 35–47)
HGB BLD-MCNC: 14.6 G/DL (ref 11.7–15.7)
HGB UR QL STRIP: NEGATIVE
HYALINE CASTS #/AREA URNS LPF: 5 /LPF (ref 0–2)
IMM GRANULOCYTES # BLD: 0.1 10E9/L (ref 0–0.4)
IMM GRANULOCYTES NFR BLD: 0.5 %
KETONES UR STRIP-MCNC: NEGATIVE MG/DL
LEUKOCYTE ESTERASE UR QL STRIP: NEGATIVE
LYMPHOCYTES # BLD AUTO: 0.6 10E9/L (ref 0.8–5.3)
LYMPHOCYTES NFR BLD AUTO: 5.1 %
MCH RBC QN AUTO: 30.6 PG (ref 26.5–33)
MCHC RBC AUTO-ENTMCNC: 33.5 G/DL (ref 31.5–36.5)
MCV RBC AUTO: 91 FL (ref 78–100)
MONOCYTES # BLD AUTO: 0.8 10E9/L (ref 0–1.3)
MONOCYTES NFR BLD AUTO: 7 %
MUCOUS THREADS #/AREA URNS LPF: PRESENT /LPF
NEUTROPHILS # BLD AUTO: 10.1 10E9/L (ref 1.6–8.3)
NEUTROPHILS NFR BLD AUTO: 87 %
NITRATE UR QL: NEGATIVE
NRBC # BLD AUTO: 0 10*3/UL
NRBC BLD AUTO-RTO: 0 /100
PH UR STRIP: 6 PH (ref 5–7)
PLATELET # BLD AUTO: 288 10E9/L (ref 150–450)
POTASSIUM SERPL-SCNC: 3.1 MMOL/L (ref 3.4–5.3)
PROT SERPL-MCNC: 7.4 G/DL (ref 6.8–8.8)
RBC # BLD AUTO: 4.77 10E12/L (ref 3.8–5.2)
RBC #/AREA URNS AUTO: 2 /HPF (ref 0–2)
SODIUM SERPL-SCNC: 137 MMOL/L (ref 133–144)
SOURCE: ABNORMAL
SP GR UR STRIP: 1.02 (ref 1–1.03)
SQUAMOUS #/AREA URNS AUTO: 4 /HPF (ref 0–1)
UROBILINOGEN UR STRIP-MCNC: NORMAL MG/DL (ref 0–2)
WBC # BLD AUTO: 11.7 10E9/L (ref 4–11)
WBC #/AREA URNS AUTO: 2 /HPF (ref 0–5)

## 2019-12-09 PROCEDURE — 99284 EMERGENCY DEPT VISIT MOD MDM: CPT | Mod: 25

## 2019-12-09 PROCEDURE — 25800030 ZZH RX IP 258 OP 636: Performed by: EMERGENCY MEDICINE

## 2019-12-09 PROCEDURE — 25000132 ZZH RX MED GY IP 250 OP 250 PS 637: Performed by: EMERGENCY MEDICINE

## 2019-12-09 PROCEDURE — 25000128 H RX IP 250 OP 636: Performed by: EMERGENCY MEDICINE

## 2019-12-09 PROCEDURE — 96374 THER/PROPH/DIAG INJ IV PUSH: CPT

## 2019-12-09 PROCEDURE — 85025 COMPLETE CBC W/AUTO DIFF WBC: CPT | Performed by: EMERGENCY MEDICINE

## 2019-12-09 PROCEDURE — 96375 TX/PRO/DX INJ NEW DRUG ADDON: CPT

## 2019-12-09 PROCEDURE — 93005 ELECTROCARDIOGRAM TRACING: CPT

## 2019-12-09 PROCEDURE — 81001 URINALYSIS AUTO W/SCOPE: CPT | Performed by: EMERGENCY MEDICINE

## 2019-12-09 PROCEDURE — 80053 COMPREHEN METABOLIC PANEL: CPT | Performed by: EMERGENCY MEDICINE

## 2019-12-09 RX ORDER — ONDANSETRON 2 MG/ML
4 INJECTION INTRAMUSCULAR; INTRAVENOUS ONCE
Status: COMPLETED | OUTPATIENT
Start: 2019-12-09 | End: 2019-12-09

## 2019-12-09 RX ORDER — POTASSIUM CHLORIDE 1.5 G/1.58G
20 POWDER, FOR SOLUTION ORAL ONCE
Status: COMPLETED | OUTPATIENT
Start: 2019-12-09 | End: 2019-12-09

## 2019-12-09 RX ORDER — DIPHENOXYLATE HCL/ATROPINE 2.5-.025MG
1 TABLET ORAL ONCE
Status: COMPLETED | OUTPATIENT
Start: 2019-12-09 | End: 2019-12-09

## 2019-12-09 RX ORDER — KETOROLAC TROMETHAMINE 15 MG/ML
15 INJECTION, SOLUTION INTRAMUSCULAR; INTRAVENOUS ONCE
Status: COMPLETED | OUTPATIENT
Start: 2019-12-09 | End: 2019-12-09

## 2019-12-09 RX ORDER — ONDANSETRON 4 MG/1
4 TABLET, ORALLY DISINTEGRATING ORAL EVERY 8 HOURS PRN
Qty: 15 TABLET | Refills: 0 | Status: SHIPPED | OUTPATIENT
Start: 2019-12-09 | End: 2019-12-14

## 2019-12-09 RX ORDER — SODIUM CHLORIDE 9 MG/ML
1000 INJECTION, SOLUTION INTRAVENOUS CONTINUOUS
Status: DISCONTINUED | OUTPATIENT
Start: 2019-12-09 | End: 2019-12-10 | Stop reason: HOSPADM

## 2019-12-09 RX ORDER — POTASSIUM CHLORIDE 1.5 G/1.58G
20 POWDER, FOR SOLUTION ORAL 2 TIMES DAILY
Qty: 4 PACKET | Refills: 0 | Status: SHIPPED | OUTPATIENT
Start: 2019-12-09 | End: 2019-12-11

## 2019-12-09 RX ADMIN — ONDANSETRON HYDROCHLORIDE 4 MG: 2 INJECTION, SOLUTION INTRAMUSCULAR; INTRAVENOUS at 22:03

## 2019-12-09 RX ADMIN — DIPHENOXYLATE HYDROCHLORIDE AND ATROPINE SULFATE 1 TABLET: 2.5; .025 TABLET ORAL at 22:02

## 2019-12-09 RX ADMIN — POTASSIUM CHLORIDE 20 MEQ: 1.5 POWDER, FOR SOLUTION ORAL at 22:49

## 2019-12-09 RX ADMIN — KETOROLAC TROMETHAMINE 15 MG: 15 INJECTION, SOLUTION INTRAMUSCULAR; INTRAVENOUS at 22:05

## 2019-12-09 RX ADMIN — SODIUM CHLORIDE 1000 ML: 9 INJECTION, SOLUTION INTRAVENOUS at 22:03

## 2019-12-09 ASSESSMENT — ENCOUNTER SYMPTOMS
VOMITING: 1
NAUSEA: 1
FLANK PAIN: 1
FEVER: 1
ABDOMINAL PAIN: 1
DIARRHEA: 1
DYSURIA: 0
BLOOD IN STOOL: 0

## 2019-12-09 NOTE — ED AVS SNAPSHOT
[facility] Emergency Department  [address]  [facility] [address]  Phone:  [phone]  Fax:  [phone]                                    Neetu Bella   MRN: [id number]    Department:  [facility] Emergency Department   Date of Visit:  12/9/2019           After Visit Summary Signature Page    I have received my discharge instructions, and my questions have been answered. I have discussed any challenges I see with this plan with the nurse or doctor.    ..........................................................................................................................................  Patient/Patient Representative Signature      ..........................................................................................................................................  Patient Representative Print Name and Relationship to Patient    ..................................................               ................................................  Date                                   Time    ..........................................................................................................................................  Reviewed by Signature/Title    ...................................................              ..............................................  Date                                               Time          22EPIC Rev 08/18

## 2019-12-10 ENCOUNTER — HOSPITAL ENCOUNTER (EMERGENCY)
Dept: LAB | Facility: CLINIC | Age: 34
End: 2019-12-10
Attending: EMERGENCY MEDICINE
Payer: COMMERCIAL

## 2019-12-10 DIAGNOSIS — R19.7 DIARRHEA, UNSPECIFIED TYPE: ICD-10-CM

## 2019-12-10 LAB — INTERPRETATION ECG - MUSE: NORMAL

## 2019-12-10 PROCEDURE — 87506 IADNA-DNA/RNA PROBE TQ 6-11: CPT | Performed by: EMERGENCY MEDICINE

## 2019-12-10 PROCEDURE — 87493 C DIFF AMPLIFIED PROBE: CPT | Performed by: EMERGENCY MEDICINE

## 2019-12-10 NOTE — ED PROVIDER NOTES
History     Chief Complaint:  Nausea, Vomiting, & Diarrhea    HPI   Neetu Bella is a 34 year old female with a history of gastroesophageal reflux disease who presents to the emergency department today for evaluation of nausea, vomiting, and diarrhea. The patient reports the development of nausea, vomiting x1 hourly, diarrhea, generalized abdominal cramping, a fever, decreased urine output, and bilateral flank pain resulting in an inability to sleep. She denies any bloody diarrhea, dysuria, or known sick contacts.   Patient did have antibiotics 1 month ago.  Crampy abdominal pain.  No fevers.  No melena or hematochezia.  Patient has not urinated recently.    Allergies:  Codeine     Medications:    Zoloft  Zantac  Prilosec    Past Medical History:    Complication of anesthesia  Depression   Generalized anxiety disorder  Obesity   Gastroesophageal reflux disease     Past Surgical History:    Laparoscopic cholecystectomy with cholangiograms  Tonsillectomy  [place] teeth extraction    Family History:    Father: skin cancer, hypertension, hyperlipidemia, substance abuse, obesity  Mother: hypertension, anesthesia reaction  Sister: hypertension, hyperlipidemia, multiple sclerosis  Brother: hypertension, obesity, hyperlipidemia     Social History:  The patient was accompanied to the ED by a female .  Smoking Status: Never Smoker  Smokeless Tobacco: Never Used  Alcohol Use: Negative  Drug Use: Negative  Marital Status:        Review of Systems   Constitutional: Positive for fever.   Gastrointestinal: Positive for abdominal pain, diarrhea, nausea and vomiting. Negative for blood in stool.   Genitourinary: Positive for decreased urine volume and flank pain. Negative for dysuria.   All other systems reviewed and are negative.     ED Triage Notes  C/O diarrhea x 10 and emesis x 20 today. Febrile up to 100 at home. Sx began yesterday. Generalized abdominal cramping. Pt reports aprox 2-3 hours prior  to sx pt ate noodles from [employer] that may have been contaminated with Ecoli. HR on apple watch 140BPM     Physical Exam     Patient Vitals for the past 24 hrs:   BP Temp Temp src Pulse Heart Rate Resp SpO2 Weight   12/09/19 2215 109/67 98.8  F (37.1  C) Oral 102 -- -- 94 % --   12/09/19 2200 116/72 -- -- 103 -- -- 96 % --   12/09/19 2145 112/65 -- -- 111 -- -- 94 % --   12/09/19 2130 106/73 -- -- 122 -- -- 95 % --   12/09/19 2126 102/70 -- -- -- -- -- -- --   12/09/19 2116 -- 100.8  F (38.2  C) Oral -- 117 18 95 % 117 kg (257 lb 15 oz)     Physical Exam  GEN: patient appears tired, friend at the bedside  HEAD: atraumatic, normocephalic  EYES: pupils reactive,  conjunctivae normal  ENT: TMs flat and white bilaterally, oropharynx normal with no erythema or exudate, mucus membranes dry  NECK: no cervical LAD  RESPIRATORY: no tachypnea, breath sounds clear to auscultation (no rales, wheezes, rhonchi)  CVS: normal S1/S2, no murmurs/rubs/gallops, mild tachycardia  ABDOMEN: soft, nontender, no masses or organomegaly, no rebound, decreased bowel sounds, no peritoneal signs  BACK: no costovertebral angle tenderness  EXTREMITIES: intact pulses x 4, full range of motion at joints, no edema  MUSCULOSKELETAL: no deformities  SKIN: warm and dry, no acute rashes   NEURO: GCS 15, cranial nerves intact.  Motor- moves all 4 extremities.    Coordination- romberg negative, normal tandem gait, no ataxia.  Overall symmetrical exam  HEME: no bruising or petechiae/contusions  LYMPH: no lymphadenopathy    Emergency Department Course     ECG:  ECG taken at 2137, ECG read at 2137  Sinus tachycardia  Nonspecific T wave abnormality  Abnormal ECG  Rate 114 bpm. WI interval 138 ms. QRS duration 84 ms. QT/QTc 332/457 ms. P-R-T axes 21 13 7.    Laboratory:  Laboratory findings were communicated with the patient who voiced understanding of the findings.    CBC: WBC 11.7 (H), HGB 14.6,   CMP: Potassium 3.1 (L), Glucose 101 (H) o/w WNL  (Creatinine 0.66)    UA with Microscopic: Protein Albumin 30 (A), Bacteria few (A), Squamous Epithelial 4 (H), Mucous present (A), Hyaline Casts 5 (H), o/w WNL      ED Notes  HCG <5. Istat hcg per RN       Interventions:  2202 Lomotil 1 tablet Oral  2203 NS 1000 ml IV  2203 Zofran 4 mg IV  2205 Toradol 15 mg Oral  2249 Klor-con 20 mEq Oral  [place]  Cardiac/Sp02 monitoring    Emergency Department Course:    2124 Nursing notes and vitals reviewed.    2126 I performed an exam of the patient as documented above.     2136 IV was inserted and blood was drawn for laboratory testing, results above.    2137 EKG obtained as noted above.    2248 The patient provided a urine sample here in the emergency department. This was sent for laboratory testing, findings above.    2254  /67   Pulse 102   Temp 98.8  F (37.1  C) (Oral)   Resp 18   Wt 117 kg (257 lb 15 oz)   SpO2 94%   BMI 42.92 kg/m    No Patient Care Coordination Note on file.  Updated patient    Findings and plan explained to the Patient. Patient discharged home with instructions regarding supportive care, medications, and reasons to return. The importance of close follow-up was reviewed. The patient was prescribed zofran and potassium chloride.     11:39 PM recheck, repeat abdominal exam soft.    Discussed results with patient.  Gave patient copies of all results (applicable labs, CT scans and/or ultrasounds).  Answered questions.  Asked patient to followup with PCP.  Circled any abnormal lab values and asked patient to followup with PCP.    Impression & Plan      Medical Decision Making:  Neetu Bella is a very pleasant  34 year old female who has had several days of nausea, vomiting, and diarrhea with an inability to urinate. She feels very dehydrated and did take antibiotics about one month ago. There has been no blood in her stools. She has experienced low grade fevers.   When she first presented she was tachycardic. She did appear  dehydrated. IV hydration was started.  Labs were sent.     I did request a stool sample, though she was not able to give this here. She was given IV fluids and pain medication. Her potassium came back low at 3.1. Her CBC was otherwise normal. EKG was performed because she was tachycardic. Recheck shows that she is no longer tachycardic. Her fever was initially 100.8, and she was given sodium chloride in addition to zofran, toradol, lomotil, and potassium. She is feeling much better. We will send her home with zofran and stool vials. She will continue to hydrate and push fluids. We will write her for potassium supplementation for tomorrow. She understands the reasons to return.   Noted low potassium.    At recheck, no signs of urine infection.  Patient not able to give a stool sample and will give containers to take home.  Plan- zofran as needed, immodium.  She will bring back the stool samples for evaluation.  Plan for KCL supplementation 2x per day for 2 days.    Diagnosis:    ICD-10-CM    1. Diarrhea, unspecified type R19.7 UA with Microscopic     CLOSTRIDIUM DIFFICILE TOXIN B PCR     Clostridium difficile toxin B PCR     Enteric Bacteria and Virus Panel by JOSE Stool   2. Fever in adult R50.9    3. Hypokalemia E87.6      Disposition:   The patient is discharged to home.    Discharge Medications:  New Prescriptions    ONDANSETRON (ZOFRAN ODT) 4 MG ODT TAB    Take 1 tablet (4 mg) by mouth every 8 hours as needed for nausea    POTASSIUM CHLORIDE (KLOR-CON) 20 MEQ PACKET    Take 20 mEq by mouth 2 times daily for 2 days     Instructions to patient:  Because of the fever and recent antibiotics, we would like a stool sample.    If you cannot provide one today, we will send vials home with you to collect the stool and bring back to the lab.    Potassium was low today (likely from diarrhea and dehydration).  You have been given supplementation in the ED and I will write another Rx for additional potassium tomorrow.    Your  treatment plan includes:    1) calling your PCP for followup in the next 2-3 days.    2) taking the new medicines we prescribed for you today- zofran    3) come back if you get worse    Reasons to return: chest pain, shortness of breath, nausea/vomiting, bleeding, confusion, blood in stools, dizziness, passing out, increasing headache, weakness, inability to walk.  Also return if increasing cough, difficulty breathing, nausea/vomiting, confusion, increasing abdominal pain.     Scribe Disclosure:  I, Chuyita Colvin, am serving as a scribe at 9:31 PM on 12/9/2019 to document services personally performed by Maddie uKo MD based on my observations and the provider's statements to me.    [facility] EMERGENCY DEPARTMENT       Maddie Kuo MD  12/10/19 [id number]

## 2019-12-10 NOTE — DISCHARGE INSTRUCTIONS
Because of the fever and recent antibiotics, we would like a stool sample.    If you cannot provide one today, we will send vials home with you to collect the stool and bring back to the lab.    Potassium was low today (likely from diarrhea and dehydration).  You have been given supplementation in the ED and I will write another Rx for additional potassium tomorrow.    Your treatment plan includes:    1) calling your PCP for followup in the next 2-3 days.    2) taking the new medicines we prescribed for you today- zofran    3) come back if you get worse    Reasons to return: chest pain, shortness of breath, nausea/vomiting, bleeding, confusion, blood in stools, dizziness, passing out, increasing headache, weakness, inability to walk.  Also return if increasing cough, difficulty breathing, nausea/vomiting, confusion, increasing abdominal pain.

## 2019-12-10 NOTE — ED TRIAGE NOTES
C/O diarrhea x 10 and emesis x 20 today. Febrile up to 100 at home. Sx began yesterday. Generalized abdominal cramping. Pt reports aprox 2-3 hours prior to sx pt ate noodles from [employer] that may have been contaminated with Ecoli. HR on apple watch 140BPM. ABC in tact. A/Ox4

## 2019-12-11 ENCOUNTER — TELEPHONE (OUTPATIENT)
Dept: EMERGENCY MEDICINE | Facility: CLINIC | Age: 34
End: 2019-12-11

## 2019-12-11 LAB
C COLI+JEJUNI+LARI FUSA STL QL NAA+PROBE: NOT DETECTED
EC STX1 GENE STL QL NAA+PROBE: NOT DETECTED
EC STX2 GENE STL QL NAA+PROBE: NOT DETECTED
ENTERIC PATHOGEN COMMENT: ABNORMAL
NOROV GI+II ORF1-ORF2 JNC STL QL NAA+PR: ABNORMAL
RVA NSP5 STL QL NAA+PROBE: NOT DETECTED
SALMONELLA SP RPOD STL QL NAA+PROBE: NOT DETECTED
SHIGELLA SP+EIEC IPAH STL QL NAA+PROBE: NOT DETECTED
V CHOL+PARA RFBL+TRKH+TNAA STL QL NAA+PR: NOT DETECTED
Y ENTERO RECN STL QL NAA+PROBE: NOT DETECTED

## 2019-12-11 NOTE — TELEPHONE ENCOUNTER
[facility] notification:    [place] ED lab result protocol used  Enteric Bacteria and Virus Panel / Norovirus Protocol    Reason for call  Notify of lab results, assess symptoms,  review ED providers recommendations/discharge instructions (if necessary) and advise per ED lab result f/u protocol    Lab Result   Final Enteric Bacteria and Virus Panel by JOSE Stool is POSITIVE for Norovirus l and ll  Patient to be notified, symptom's assessed and advised per [place] ED lab result protocol.  Information table from ED Provider visit on 12/9/19  Symptoms reported at ED visit (Chief complaint, HPI) Neetu Bella is a 34 year old female with a history of gastroesophageal reflux disease who presents to the emergency department today for evaluation of nausea, vomiting, and diarrhea. The patient reports the development of nausea, vomiting x1 hourly, diarrhea, generalized abdominal cramping, a fever, decreased urine output, and bilateral flank pain resulting in an inability to sleep. She denies any bloody diarrhea, dysuria, or known sick contacts.   Patient did have antibiotics 1 month ago.  Crampy abdominal pain.  No fevers.  No melena or hematochezia.  Patient has not urinated recently.   ED providers Impression and Plan (applicable information) Neetu Bella is a very pleasant  34 year old female who has had several days of nausea, vomiting, and diarrhea with an inability to urinate. She feels very dehydrated and did take antibiotics about one month ago. There has been no blood in her stools. She has experienced low grade fevers.   When she first presented she was tachycardic. She did appear dehydrated. IV hydration was started.  Labs were sent.      I did request a stool sample, though she was not able to give this here. She was given IV fluids and pain medication. Her potassium came back low at 3.1. Her CBC was otherwise normal. EKG was performed because  "she was tachycardic. Recheck shows that she is no longer tachycardic. Her fever was initially 100.8, and she was given sodium chloride in addition to zofran, toradol, lomotil, and potassium. She is feeling much better. We will send her home with zofran and stool vials. She will continue to hydrate and push fluids. We will write her for potassium supplementation for tomorrow. She understands the reasons to return.   Noted low potassium.     At recheck, no signs of urine infection.  Patient not able to give a stool sample and will give containers to take home.  Plan- zofran as needed, immodium.  She will bring back the stool samples for evaluation.  Plan for KCL supplementation 2x per day for 2 days.   Miscellaneous information C diff specimen not yet resulted, submitted per patient.     RN Assessment (Patient s current Symptoms), include time called.  [Insert Left message here if message left]  Neetu \"doing better\".  Is having diarrhea but has improved.  No vomiting.  Advised if any further abnormal results (C diff), she will be contacted again.      RN Recommendations/Instructions per [place] ED lab result protocol  Did review general information including infection control related to Norovirus.    Please Contact your PCP clinic or return to the Emergency department if your:    Symptoms return.    Symptoms worsen or other concerning symptom's.    PCP follow-up Questions asked: YES       Quin Guerrero RN    [facility]   Lung Nodule and ED Lab Results F/U RN  Epic pool (ED late result f/u RN) : P [id number]   # [phone]    Copy of Lab result   Contains abnormal data Enteric Bacteria and Virus Panel by JOSE Stool   Order: [id number]   Status:  Final result   Visible to patient:  Yes (MyChart) Next appt:  None Dx:  Diarrhea, unspecified type   Specimen Information: Feces         Ref Range & Units 1d ago   Campylobacter group by JOSE NDET^Not Detected Not Detected    Salmonella species by JOSE NDET^Not " Detected Not Detected    Shigella species by JOSE NDET^Not Detected Not Detected    Vibrio group by JOSE NDET^Not Detected Not Detected    Rotavirus A by JOSE NDET^Not Detected Not Detected    Shiga toxin 1 gene by JOSE NDET^Not Detected Not Detected    Shiga toxin 2 gene by JOSE NDET^Not Detected Not Detected    Norovirus I and II by JOSE NDET^Not Detected Detected, Abnormal ResultAbnormal     Yersinia enterocolitica by JOSE NDET^Not Detected Not Detected    Enteric pathogen comment  Testing performed by multiplexed, qualitative PCR using the [employer] Enteric   Pathogens Nucleic Acid Test. Results should not be used as the sole basis for diagnosis,   treatment, or other patient management decisions.    Comment: Positive results do not rule out co-infection with other organisms that are   not detected by this test, and may not be the sole or definitive cause of   patient illness.   Negative results in the setting of clinical illness compatible with   gastroenteritis may be due to infection by pathogens that are not detected by   this test or non-infectious causes such as ulcerative colitis, irritable bowel    syndrome, or Crohn's disease.   Note: Shiga toxin producing E. coli (STEC) typically harbor one or both genes   that encode for Shiga toxins 1 and 2.

## 2020-03-10 ENCOUNTER — HEALTH MAINTENANCE LETTER (OUTPATIENT)
Age: 35
End: 2020-03-10

## 2021-10-02 ENCOUNTER — LAB REQUISITION (OUTPATIENT)
Dept: LAB | Facility: CLINIC | Age: 36
End: 2021-10-02
Payer: COMMERCIAL

## 2021-10-02 DIAGNOSIS — M25.561 PAIN IN RIGHT KNEE: ICD-10-CM

## 2021-10-02 LAB
APPEARANCE FLD: ABNORMAL
COLOR FLD: YELLOW
CRYSTALS SNV MICRO: NORMAL
GRAM STAIN RESULT: ABNORMAL
GRAM STAIN RESULT: ABNORMAL
LYMPHOCYTES NFR FLD MANUAL: 2 %
MONOS+MACROS NFR FLD MANUAL: 6 %
NEUTS BAND NFR FLD MANUAL: 92 %
RBC # FLD: 5000 /UL
WBC # FLD AUTO: 8954 /UL

## 2021-10-02 PROCEDURE — 89060 EXAM SYNOVIAL FLUID CRYSTALS: CPT | Mod: ORL | Performed by: PHYSICIAN ASSISTANT

## 2021-10-02 PROCEDURE — 87075 CULTR BACTERIA EXCEPT BLOOD: CPT | Mod: ORL | Performed by: PHYSICIAN ASSISTANT

## 2021-10-02 PROCEDURE — 87070 CULTURE OTHR SPECIMN AEROBIC: CPT | Mod: ORL | Performed by: PHYSICIAN ASSISTANT

## 2021-10-02 PROCEDURE — 87476 LYME DIS DNA AMP PROBE: CPT | Mod: ORL | Performed by: PHYSICIAN ASSISTANT

## 2021-10-02 PROCEDURE — 89051 BODY FLUID CELL COUNT: CPT | Mod: ORL | Performed by: PHYSICIAN ASSISTANT

## 2021-10-02 PROCEDURE — 87205 SMEAR GRAM STAIN: CPT | Mod: ORL | Performed by: PHYSICIAN ASSISTANT

## 2021-10-05 LAB
BACTERIA SNV CULT: NO GROWTH
BACTERIA SNV CULT: NORMAL

## 2021-10-06 LAB — B BURGDOR DNA SPEC QL NAA+PROBE: NOT DETECTED

## 2021-10-21 PROCEDURE — 88304 TISSUE EXAM BY PATHOLOGIST: CPT | Mod: TC,ORL | Performed by: ORTHOPAEDIC SURGERY

## 2021-10-22 ENCOUNTER — LAB REQUISITION (OUTPATIENT)
Dept: LAB | Facility: CLINIC | Age: 36
End: 2021-10-22
Payer: COMMERCIAL

## 2021-10-22 DIAGNOSIS — M71.21 SYNOVIAL CYST OF POPLITEAL SPACE (BAKER), RIGHT KNEE: ICD-10-CM

## 2021-10-25 LAB
PATH REPORT.COMMENTS IMP SPEC: NORMAL
PATH REPORT.COMMENTS IMP SPEC: NORMAL
PATH REPORT.FINAL DX SPEC: NORMAL
PATH REPORT.GROSS SPEC: NORMAL
PATH REPORT.MICROSCOPIC SPEC OTHER STN: NORMAL
PATH REPORT.RELEVANT HX SPEC: NORMAL
PHOTO IMAGE: NORMAL

## 2021-10-25 PROCEDURE — 88304 TISSUE EXAM BY PATHOLOGIST: CPT | Mod: 26 | Performed by: PATHOLOGY

## 2023-11-06 ENCOUNTER — HOSPITAL ENCOUNTER (EMERGENCY)
Facility: CLINIC | Age: 38
Discharge: HOME | End: 2023-11-06
Attending: EMERGENCY MEDICINE | Admitting: EMERGENCY MEDICINE
Payer: COMMERCIAL

## 2023-11-06 ENCOUNTER — APPOINTMENT (OUTPATIENT)
Dept: GENERAL RADIOLOGY | Facility: CLINIC | Age: 38
End: 2023-11-06
Attending: EMERGENCY MEDICINE
Payer: COMMERCIAL

## 2023-11-06 VITALS
HEART RATE: 61 BPM | SYSTOLIC BLOOD PRESSURE: 123 MMHG | OXYGEN SATURATION: 95 % | TEMPERATURE: 96.8 F | RESPIRATION RATE: 14 BRPM | DIASTOLIC BLOOD PRESSURE: 82 MMHG

## 2023-11-06 DIAGNOSIS — R07.9 CHEST PAIN, UNSPECIFIED TYPE: ICD-10-CM

## 2023-11-06 DIAGNOSIS — R00.2 PALPITATIONS: Primary | ICD-10-CM

## 2023-11-06 LAB
ANION GAP SERPL CALCULATED.3IONS-SCNC: 9 MMOL/L (ref 7–15)
ATRIAL RATE - MUSE: 69 BPM
BASOPHILS # BLD AUTO: 0.1 10E3/UL (ref 0–0.2)
BASOPHILS NFR BLD AUTO: 1 %
BUN SERPL-MCNC: 14.8 MG/DL (ref 6–20)
CALCIUM SERPL-MCNC: 9.4 MG/DL (ref 8.6–10)
CHLORIDE SERPL-SCNC: 103 MMOL/L (ref 98–107)
CREAT SERPL-MCNC: 0.73 MG/DL (ref 0.51–0.95)
DEPRECATED HCO3 PLAS-SCNC: 26 MMOL/L (ref 22–29)
DIASTOLIC BLOOD PRESSURE - MUSE: NORMAL MMHG
EGFRCR SERPLBLD CKD-EPI 2021: >90 ML/MIN/1.73M2
EOSINOPHIL # BLD AUTO: 0.2 10E3/UL (ref 0–0.7)
EOSINOPHIL NFR BLD AUTO: 2 %
ERYTHROCYTE [DISTWIDTH] IN BLOOD BY AUTOMATED COUNT: 12.4 % (ref 10–15)
GLUCOSE SERPL-MCNC: 105 MG/DL (ref 70–99)
HCT VFR BLD AUTO: 42.1 % (ref 35–47)
HGB BLD-MCNC: 14.2 G/DL (ref 11.7–15.7)
IMM GRANULOCYTES # BLD: 0 10E3/UL
IMM GRANULOCYTES NFR BLD: 0 %
INTERPRETATION ECG - MUSE: NORMAL
LYMPHOCYTES # BLD AUTO: 4.1 10E3/UL (ref 0.8–5.3)
LYMPHOCYTES NFR BLD AUTO: 37 %
MAGNESIUM SERPL-MCNC: 2 MG/DL (ref 1.7–2.3)
MCH RBC QN AUTO: 30.5 PG (ref 26.5–33)
MCHC RBC AUTO-ENTMCNC: 33.7 G/DL (ref 31.5–36.5)
MCV RBC AUTO: 91 FL (ref 78–100)
MONOCYTES # BLD AUTO: 1 10E3/UL (ref 0–1.3)
MONOCYTES NFR BLD AUTO: 9 %
NEUTROPHILS # BLD AUTO: 5.6 10E3/UL (ref 1.6–8.3)
NEUTROPHILS NFR BLD AUTO: 51 %
NRBC # BLD AUTO: 0 10E3/UL
NRBC BLD AUTO-RTO: 0 /100
P AXIS - MUSE: 20 DEGREES
PLATELET # BLD AUTO: 279 10E3/UL (ref 150–450)
POTASSIUM SERPL-SCNC: 3.8 MMOL/L (ref 3.4–5.3)
PR INTERVAL - MUSE: 132 MS
QRS DURATION - MUSE: 100 MS
QT - MUSE: 440 MS
QTC - MUSE: 471 MS
R AXIS - MUSE: 27 DEGREES
RBC # BLD AUTO: 4.65 10E6/UL (ref 3.8–5.2)
SODIUM SERPL-SCNC: 138 MMOL/L (ref 135–145)
SYSTOLIC BLOOD PRESSURE - MUSE: NORMAL MMHG
T AXIS - MUSE: 26 DEGREES
TROPONIN T SERPL HS-MCNC: <6 NG/L
VENTRICULAR RATE- MUSE: 69 BPM
WBC # BLD AUTO: 11.1 10E3/UL (ref 4–11)

## 2023-11-06 PROCEDURE — 96374 THER/PROPH/DIAG INJ IV PUSH: CPT

## 2023-11-06 PROCEDURE — 83735 ASSAY OF MAGNESIUM: CPT | Performed by: EMERGENCY MEDICINE

## 2023-11-06 PROCEDURE — 71046 X-RAY EXAM CHEST 2 VIEWS: CPT

## 2023-11-06 PROCEDURE — 99285 EMERGENCY DEPT VISIT HI MDM: CPT | Mod: 25

## 2023-11-06 PROCEDURE — 250N000011 HC RX IP 250 OP 636: Mod: JZ | Performed by: EMERGENCY MEDICINE

## 2023-11-06 PROCEDURE — 85025 COMPLETE CBC W/AUTO DIFF WBC: CPT | Performed by: EMERGENCY MEDICINE

## 2023-11-06 PROCEDURE — 84484 ASSAY OF TROPONIN QUANT: CPT | Performed by: EMERGENCY MEDICINE

## 2023-11-06 PROCEDURE — 36415 COLL VENOUS BLD VENIPUNCTURE: CPT | Performed by: EMERGENCY MEDICINE

## 2023-11-06 PROCEDURE — 93005 ELECTROCARDIOGRAM TRACING: CPT

## 2023-11-06 PROCEDURE — 80048 BASIC METABOLIC PNL TOTAL CA: CPT | Performed by: EMERGENCY MEDICINE

## 2023-11-06 RX ORDER — KETOROLAC TROMETHAMINE 15 MG/ML
15 INJECTION, SOLUTION INTRAMUSCULAR; INTRAVENOUS ONCE
Status: COMPLETED | OUTPATIENT
Start: 2023-11-06 | End: 2023-11-06

## 2023-11-06 RX ADMIN — KETOROLAC TROMETHAMINE 15 MG: 15 INJECTION, SOLUTION INTRAMUSCULAR; INTRAVENOUS at 02:59

## 2023-11-06 ASSESSMENT — ACTIVITIES OF DAILY LIVING (ADL)
ADLS_ACUITY_SCORE: 35
ADLS_ACUITY_SCORE: 35

## 2023-11-06 NOTE — ED TRIAGE NOTES
Patient went top bed around 10pm but had some palpitations in her chest along with some shortness of breath.  Also states that she has some left sided chest pain and a strange feeling in her left arm.  No nausea or diaphoresis.     Triage Assessment (Adult)       Row Name 11/06/23 0221          Triage Assessment    Airway WDL WDL        Respiratory WDL    Respiratory WDL WDL        Skin Circulation/Temperature WDL    Skin Circulation/Temperature WDL WDL        Cardiac WDL    Cardiac WDL chest pain

## 2023-11-06 NOTE — ED PROVIDER NOTES
History     Chief Complaint:  Palpitations    The history is provided by the patient.     Neetu Bella is a 38 year old female with history of depression, anxiety, and GERD presenting for evaluation of palpitations. Neetu explains that she began experiencing palpitations after she went to bed tonight (2200). She states that she went to sleep but woke up a short while later and the palpitations were still occurring. She adds that she then went to the bathroom and when she returned to bed her left arm started to feel cold and she developed shortness of breath and non-radiating tension in her chest. She notes a headache and dizziness onset while en route to the ED. She notes that the palpitations are currently still present but are less frequent. She denies a fever, cough, or recent illness. She denies a history of similar symptoms. She denies a history of smoking. She denies recent surgeries. She denies a cardiac or lung history.     Independent Historian:   None - Patient Only    Medications:    Zoloft  Prilosec    Past Medical History:    Depression  WHIT  GERD    Past Surgical History:    Laparoscopic cholecystectomy with cholangiograms  Tonsillectomy and adenoidectomy  [place] teeth    Physical Exam   Patient Vitals for the past 24 hrs:   BP Temp Temp src Pulse Resp SpO2   11/06/23 0500 123/82 -- -- 61 -- 95 %   11/06/23 0445 121/77 -- -- -- -- 94 %   11/06/23 0406 118/80 -- -- 62 -- 96 %   11/06/23 0300 (!) 140/79 -- -- 67 14 97 %   11/06/23 0230 (!) 152/102 -- -- 81 -- 98 %   11/06/23 0220 -- 96.8  F (36  C) Temporal 77 18 99 %     Physical Exam  General: Sitting up in bed  Eyes:  The pupils are equal and round    Conjunctivae and sclerae are normal  ENT:    Atraumatic face  Neck:  Normal range of motion  CV:  Regular rate, regular rhythm    Skin warm and well perfused     Radial pulse 2+ on left  Resp:  Non labored breathing on room air    No tachypnea    No cough heard, lungs clear  bilaterally  MS:  Non tender chest wall, non tender neck  Skin:  No rash or acute skin lesions noted  Neuro:   Awake, alert.      Speech is normal and fluent.    Face is symmetric.     Moves all extremities equally    SILT on bilateral UE/LE    Finger to nose intact bilaterally    No weakness on UE/LE  Psych: Normal affect.  Appropriate interactions.    Emergency Department Course   ECG  ECG taken at 0225, ECG read at 0230  Normal sinus rhythm  Normal ECG   No significant change as compared to prior, dated 12/9/19.  Rate 69 bpm. AR interval 132 ms. QRS duration 100 ms. QT/QTc 440/471 ms. P-R-T axes 20 27 26.     Imaging:  XR Chest 2 Views   Final Result   IMPRESSION: Cardiomediastinal silhouette within normal limits. No focal consolidation or pleural effusion.      Leadless EKG Monitor 3 to 7 Days    (Results Pending)      Laboratory:  Labs Ordered and Resulted from Time of ED Arrival to Time of ED Departure   BASIC METABOLIC PANEL - Abnormal       Result Value    Sodium 138      Potassium 3.8      Chloride 103      Carbon Dioxide (CO2) 26      Anion Gap 9      Urea Nitrogen 14.8      Creatinine 0.73      GFR Estimate >90      Calcium 9.4      Glucose 105 (*)    CBC WITH PLATELETS AND DIFFERENTIAL - Abnormal    WBC Count 11.1 (*)     RBC Count 4.65      Hemoglobin 14.2      Hematocrit 42.1      MCV 91      MCH 30.5      MCHC 33.7      RDW 12.4      Platelet Count 279      % Neutrophils 51      % Lymphocytes 37      % Monocytes 9      % Eosinophils 2      % Basophils 1      % Immature Granulocytes 0      NRBCs per 100 WBC 0      Absolute Neutrophils 5.6      Absolute Lymphocytes 4.1      Absolute Monocytes 1.0      Absolute Eosinophils 0.2      Absolute Basophils 0.1      Absolute Immature Granulocytes 0.0      Absolute NRBCs 0.0     TROPONIN T, HIGH SENSITIVITY - Normal    Troponin T, High Sensitivity <6     MAGNESIUM - Normal    Magnesium 2.0        Emergency Department Course & Assessments:        Interventions:  Medications   ketorolac (TORADOL) injection 15 mg (15 mg Intravenous $Given 11/6/23 5413)     Assessments:  0234 I obtained history and examined the patient as noted above.   0524 I discussed findings and discharge with the patient. All questions answered.     Independent Interpretation (X-rays, CTs, rhythm strip):  I reviewed the patient's chest X-ray and it is negative, with clear lungs.    Consultations/Discussion of Management or Tests:  None        Disposition:  The patient was discharged to home.     Impression & Plan    Medical Decision Making:  Neetu Bella is a 38 year old female who presented to the ED with palpitations. EKG with sinus rhythm, no acute ischemic changes. No arrhythmia seen on monitor. Troponin normal. Doubt PE, ACS, dissection. Has a good pulse in left arm with no swelling or neurologic changes such as weakness and sensation intact. Doubt stroke. Patient felt improved in ED and symptoms almost all resolved. Main concern/initial symptom was palpitations. Offered zio patch and this ordered. Discussed follow-up with PCP.    Diagnosis:    ICD-10-CM    1. Palpitations  R00.2 Leadless EKG Monitor 3 to 7 Days      2. Chest pain, unspecified type  R07.9         Scribe Disclosure:  I, Franko Kim, am serving as a scribe at 2:31 AM on 11/6/2023 to document services personally performed by Susy Albarran MD based on my observations and the provider's statements to me.   11/6/2023   Susy Albarran MD Goertz, Maria Kristine, MD  11/06/23 2040

## 2023-11-13 ENCOUNTER — HOSPITAL ENCOUNTER (OUTPATIENT)
Dept: CARDIOLOGY | Facility: CLINIC | Age: 38
Discharge: HOME | End: 2023-11-13
Attending: EMERGENCY MEDICINE | Admitting: EMERGENCY MEDICINE
Payer: COMMERCIAL

## 2023-11-13 DIAGNOSIS — R00.2 PALPITATIONS: ICD-10-CM

## 2023-11-13 PROCEDURE — 93244 EXT ECG>48HR<7D REV&INTERPJ: CPT | Performed by: INTERNAL MEDICINE

## 2023-11-13 PROCEDURE — 93242 EXT ECG>48HR<7D RECORDING: CPT

## (undated) DEVICE — GLOVE PROTEXIS POWDER FREE SMT 6.5  2D72PT65X

## (undated) DEVICE — SUCTION IRR STRYKERFLOW II W/TIP 250-070-520

## (undated) DEVICE — SU VICRYL 0 CT-2 CR 8X18" J727D

## (undated) DEVICE — ENDO TROCAR SLEEVE KII Z-THREADED 05X100MM CTS02

## (undated) DEVICE — CLIP APPLIER ENDO 5MM M/L LIGAMAX EL5ML

## (undated) DEVICE — Device

## (undated) DEVICE — LINEN TOWEL PACK X5 5464

## (undated) DEVICE — DECANTER BAG 2002S

## (undated) DEVICE — SUCTION CANISTER MEDIVAC LINER 3000ML W/LID 65651-530

## (undated) DEVICE — SU VICRYL 4-0 PS-2 18" UND J496H

## (undated) DEVICE — ESU GROUND PAD ADULT W/CORD E7507

## (undated) DEVICE — CATH CHOLANGIOGRAM KUMAR CC-019

## (undated) DEVICE — ENDO TROCAR OPTICAL ACCESS KII Z-THRD 12X100MM C0R85

## (undated) DEVICE — DRAPE C-ARM 60X42" 1013

## (undated) DEVICE — GLOVE PROTEXIS BLUE W/NEU-THERA 7.0  2D73EB70

## (undated) DEVICE — ENDO POUCH UNIV RETRIEVAL SYSTEM INZII 10MM CD001

## (undated) DEVICE — SOL NACL 0.9% INJ 250ML BAG 2B1322Q

## (undated) DEVICE — LINEN HALF SHEET 5512

## (undated) DEVICE — LINEN FULL SHEET 5511

## (undated) DEVICE — RAD RX ISOVUE 300 (50ML) 61% IOPAMIDOL CHARGE PER ML

## (undated) DEVICE — ESU ELEC BLADE 2.75" COATED/INSULATED E1455

## (undated) DEVICE — CONNECTOR STOPCOCK 3 WAY MALE LL HI-FLO MX9311L

## (undated) DEVICE — GOWN XLG DISP 9545

## (undated) DEVICE — SYR 30ML LL W/O NDL 302832

## (undated) DEVICE — ESU CORD MONOPOLAR 10'  E0510

## (undated) DEVICE — LINEN POUCH DBL 5427

## (undated) DEVICE — ENDO TROCAR FIRST ENTRY KII FIOS Z-THRD 05X100MM CTF03

## (undated) DEVICE — SOL NACL 0.9% IRRIG 3000ML BAG 2B7477

## (undated) DEVICE — ESU PENCIL W/HOLSTER E2350H

## (undated) DEVICE — BAG CLEAR TRASH 1.3M 39X33" P4040C

## (undated) DEVICE — DRAPE LEGGINGS 8421

## (undated) RX ORDER — GLYCOPYRROLATE 0.2 MG/ML
INJECTION INTRAMUSCULAR; INTRAVENOUS
Status: DISPENSED
Start: 2018-11-27

## (undated) RX ORDER — CEFAZOLIN SODIUM 2 G/100ML
INJECTION, SOLUTION INTRAVENOUS
Status: DISPENSED
Start: 2018-11-27

## (undated) RX ORDER — ONDANSETRON 2 MG/ML
INJECTION INTRAMUSCULAR; INTRAVENOUS
Status: DISPENSED
Start: 2018-11-27

## (undated) RX ORDER — FENTANYL CITRATE 50 UG/ML
INJECTION, SOLUTION INTRAMUSCULAR; INTRAVENOUS
Status: DISPENSED
Start: 2018-11-27

## (undated) RX ORDER — HYDROMORPHONE HYDROCHLORIDE 1 MG/ML
INJECTION, SOLUTION INTRAMUSCULAR; INTRAVENOUS; SUBCUTANEOUS
Status: DISPENSED
Start: 2018-11-27

## (undated) RX ORDER — KETOROLAC TROMETHAMINE 30 MG/ML
INJECTION, SOLUTION INTRAMUSCULAR; INTRAVENOUS
Status: DISPENSED
Start: 2018-11-27

## (undated) RX ORDER — PROPOFOL 10 MG/ML
INJECTION, EMULSION INTRAVENOUS
Status: DISPENSED
Start: 2018-11-27

## (undated) RX ORDER — LIDOCAINE HYDROCHLORIDE 10 MG/ML
INJECTION, SOLUTION EPIDURAL; INFILTRATION; INTRACAUDAL; PERINEURAL
Status: DISPENSED
Start: 2018-11-27

## (undated) RX ORDER — NEOSTIGMINE METHYLSULFATE 1 MG/ML
VIAL (ML) INJECTION
Status: DISPENSED
Start: 2018-11-27

## (undated) RX ORDER — DEXAMETHASONE SODIUM PHOSPHATE 4 MG/ML
INJECTION, SOLUTION INTRA-ARTICULAR; INTRALESIONAL; INTRAMUSCULAR; INTRAVENOUS; SOFT TISSUE
Status: DISPENSED
Start: 2018-11-27

## (undated) RX ORDER — OXYCODONE HYDROCHLORIDE 5 MG/1
TABLET ORAL
Status: DISPENSED
Start: 2018-11-27

## (undated) RX ORDER — BUPIVACAINE HYDROCHLORIDE 2.5 MG/ML
INJECTION, SOLUTION EPIDURAL; INFILTRATION; INTRACAUDAL
Status: DISPENSED
Start: 2018-11-27